# Patient Record
Sex: MALE | Race: BLACK OR AFRICAN AMERICAN | NOT HISPANIC OR LATINO | Employment: UNEMPLOYED | URBAN - METROPOLITAN AREA
[De-identification: names, ages, dates, MRNs, and addresses within clinical notes are randomized per-mention and may not be internally consistent; named-entity substitution may affect disease eponyms.]

---

## 2023-10-04 ENCOUNTER — PREP FOR PROCEDURE (OUTPATIENT)
Dept: OPHTHALMOLOGY | Facility: CLINIC | Age: 68
End: 2023-10-04
Payer: MEDICARE

## 2023-10-04 DIAGNOSIS — H25.11 AGE-RELATED NUCLEAR CATARACT OF RIGHT EYE: Primary | ICD-10-CM

## 2023-10-04 RX ORDER — CYCLOPENTOLATE HYDROCHLORIDE 10 MG/ML
1 SOLUTION/ DROPS OPHTHALMIC
Status: CANCELLED | OUTPATIENT
Start: 2023-10-04 | End: 2023-10-04

## 2023-10-04 RX ORDER — DICLOFENAC SODIUM 1 MG/ML
1 SOLUTION/ DROPS OPHTHALMIC
Status: CANCELLED | OUTPATIENT
Start: 2023-10-04 | End: 2023-10-04

## 2023-10-04 RX ORDER — MOXIFLOXACIN 5 MG/ML
1 SOLUTION/ DROPS OPHTHALMIC
Status: CANCELLED | OUTPATIENT
Start: 2023-10-04 | End: 2023-10-04

## 2023-10-04 RX ORDER — PHENYLEPHRINE HYDROCHLORIDE 25 MG/ML
1 SOLUTION/ DROPS OPHTHALMIC
Status: CANCELLED | OUTPATIENT
Start: 2023-10-04 | End: 2023-10-04

## 2023-10-04 RX ORDER — TROPICAMIDE 10 MG/ML
1 SOLUTION/ DROPS OPHTHALMIC
Status: CANCELLED | OUTPATIENT
Start: 2023-10-04 | End: 2023-10-04

## 2023-10-04 NOTE — H&P
History Of Present Illness  Espinoza Ross is a 67 y.o. male presenting with catarct, right.     Past Medical History  He has no past medical history on file.    Surgical History  He has no past surgical history on file.     Social History  He has no history on file for tobacco use, alcohol use, and drug use.     Allergies  Patient has no allergy information on record.    ROS  Patient denies ocular pain, redness, discharge, decreased vision, double vision, blind spots, flashes, or floaters.     Physical Exam  Not recorded          Assessment/Plan   Diagnoses and all orders for this visit:  Age-related nuclear cataract of right eye  -     Place in outpatient/hospital ambulatory surgery; Standing  -     Full code; Standing  -     Discharge instructions; Standing  -     Insert peripheral IV; Standing  -     Saline lock IV; Standing  -     Weigh patient; Standing  -     Measure height; Standing  -     Vital Signs; Standing  -     Pulse Oximetry; Standing  -     moxifloxacin (Vigamox) 0.5 % ophthalmic solution 1 drop  -     cyclopentolate (Cyclogyl) 1 % ophthalmic solution 1 drop  -     phenylephrine (Mydfrin) 2.5 % ophthalmic solution 1 drop  -     tropicamide (Mydriacyl) 1 % ophthalmic solution 1 drop  -     diclofenac (Voltaren) 0.1 % ophthalmic solution 1 drop  -     Case Request Operating Room: Extraction Cataract with Insertion Intraocular Lens; Standing    Proceed with ce/iol OD           Camille Durham MD

## 2023-10-18 ENCOUNTER — APPOINTMENT (OUTPATIENT)
Dept: RADIOLOGY | Facility: HOSPITAL | Age: 68
End: 2023-10-18
Payer: MEDICARE

## 2023-10-18 ENCOUNTER — HOSPITAL ENCOUNTER (EMERGENCY)
Facility: HOSPITAL | Age: 68
Discharge: NURSING, REHAB OR LONG-TERM CARE | End: 2023-10-19
Attending: EMERGENCY MEDICINE
Payer: MEDICARE

## 2023-10-18 VITALS
SYSTOLIC BLOOD PRESSURE: 194 MMHG | OXYGEN SATURATION: 100 % | HEART RATE: 85 BPM | RESPIRATION RATE: 16 BRPM | DIASTOLIC BLOOD PRESSURE: 90 MMHG | TEMPERATURE: 97.5 F

## 2023-10-18 DIAGNOSIS — M79.605 PAIN IN BOTH LOWER EXTREMITIES: Primary | ICD-10-CM

## 2023-10-18 DIAGNOSIS — M79.604 PAIN IN BOTH LOWER EXTREMITIES: Primary | ICD-10-CM

## 2023-10-18 DIAGNOSIS — F03.911 AGITATION DUE TO DEMENTIA (MULTI): ICD-10-CM

## 2023-10-18 LAB
ALBUMIN SERPL BCP-MCNC: 3.8 G/DL (ref 3.4–5)
ALP SERPL-CCNC: 70 U/L (ref 33–136)
ALT SERPL W P-5'-P-CCNC: 29 U/L (ref 10–52)
AMPHETAMINES UR QL SCN: NORMAL
ANION GAP BLDV CALCULATED.4IONS-SCNC: 7 MMOL/L (ref 10–25)
ANION GAP SERPL CALC-SCNC: 11 MMOL/L (ref 10–20)
APAP SERPL-MCNC: <10 UG/ML
APPEARANCE UR: CLEAR
AST SERPL W P-5'-P-CCNC: 42 U/L (ref 9–39)
BARBITURATES UR QL SCN: NORMAL
BASE EXCESS BLDV CALC-SCNC: 2.7 MMOL/L (ref -2–3)
BASOPHILS # BLD AUTO: 0.02 X10*3/UL (ref 0–0.1)
BASOPHILS NFR BLD AUTO: 0.3 %
BENZODIAZ UR QL SCN: NORMAL
BILIRUB SERPL-MCNC: 0.4 MG/DL (ref 0–1.2)
BILIRUB UR STRIP.AUTO-MCNC: NEGATIVE MG/DL
BODY TEMPERATURE: 37 DEGREES CELSIUS
BUN SERPL-MCNC: 10 MG/DL (ref 6–23)
BZE UR QL SCN: NORMAL
CA-I BLDV-SCNC: 1.25 MMOL/L (ref 1.1–1.33)
CALCIUM SERPL-MCNC: 9.3 MG/DL (ref 8.6–10.6)
CANNABINOIDS UR QL SCN: NORMAL
CARDIAC TROPONIN I PNL SERPL HS: 13 NG/L (ref 0–53)
CARDIAC TROPONIN I PNL SERPL HS: 33 NG/L (ref 0–53)
CARDIAC TROPONIN I PNL SERPL HS: 41 NG/L (ref 0–53)
CHLORIDE BLDV-SCNC: 100 MMOL/L (ref 98–107)
CHLORIDE SERPL-SCNC: 100 MMOL/L (ref 98–107)
CO2 SERPL-SCNC: 29 MMOL/L (ref 21–32)
COLOR UR: YELLOW
CREAT SERPL-MCNC: 0.85 MG/DL (ref 0.5–1.3)
EOSINOPHIL # BLD AUTO: 0.01 X10*3/UL (ref 0–0.7)
EOSINOPHIL NFR BLD AUTO: 0.1 %
ERYTHROCYTE [DISTWIDTH] IN BLOOD BY AUTOMATED COUNT: 13.8 % (ref 11.5–14.5)
ETHANOL SERPL-MCNC: <10 MG/DL
FENTANYL+NORFENTANYL UR QL SCN: NORMAL
GFR SERPL CREATININE-BSD FRML MDRD: >90 ML/MIN/1.73M*2
GLUCOSE BLD MANUAL STRIP-MCNC: 240 MG/DL (ref 74–99)
GLUCOSE BLD MANUAL STRIP-MCNC: 49 MG/DL (ref 74–99)
GLUCOSE BLDV-MCNC: 71 MG/DL (ref 74–99)
GLUCOSE SERPL-MCNC: 69 MG/DL (ref 74–99)
GLUCOSE UR STRIP.AUTO-MCNC: NEGATIVE MG/DL
HCO3 BLDV-SCNC: 30.2 MMOL/L (ref 22–26)
HCT VFR BLD AUTO: 32.9 % (ref 41–52)
HCT VFR BLD EST: 35 % (ref 41–52)
HGB BLD-MCNC: 11.2 G/DL (ref 13.5–17.5)
HGB BLDV-MCNC: 11.7 G/DL (ref 13.5–17.5)
HOLD SPECIMEN: NORMAL
HOLD SPECIMEN: NORMAL
IMM GRANULOCYTES # BLD AUTO: 0.01 X10*3/UL (ref 0–0.7)
IMM GRANULOCYTES NFR BLD AUTO: 0.1 % (ref 0–0.9)
KETONES UR STRIP.AUTO-MCNC: NEGATIVE MG/DL
LACTATE BLDV-SCNC: 0.6 MMOL/L (ref 0.4–2)
LEUKOCYTE ESTERASE UR QL STRIP.AUTO: NEGATIVE
LYMPHOCYTES # BLD AUTO: 2.12 X10*3/UL (ref 1.2–4.8)
LYMPHOCYTES NFR BLD AUTO: 31.3 %
MAGNESIUM SERPL-MCNC: 1.88 MG/DL (ref 1.6–2.4)
MCH RBC QN AUTO: 29.9 PG (ref 26–34)
MCHC RBC AUTO-ENTMCNC: 34 G/DL (ref 32–36)
MCV RBC AUTO: 88 FL (ref 80–100)
MONOCYTES # BLD AUTO: 1.15 X10*3/UL (ref 0.1–1)
MONOCYTES NFR BLD AUTO: 17 %
NEUTROPHILS # BLD AUTO: 3.46 X10*3/UL (ref 1.2–7.7)
NEUTROPHILS NFR BLD AUTO: 51.2 %
NITRITE UR QL STRIP.AUTO: NEGATIVE
NRBC BLD-RTO: 0 /100 WBCS (ref 0–0)
OPIATES UR QL SCN: NORMAL
OXYCODONE+OXYMORPHONE UR QL SCN: NORMAL
OXYHGB MFR BLDV: 33.5 % (ref 45–75)
PCO2 BLDV: 60 MM HG (ref 41–51)
PCP UR QL SCN: NORMAL
PH BLDV: 7.31 PH (ref 7.33–7.43)
PH UR STRIP.AUTO: 6 [PH]
PLATELET # BLD AUTO: 151 X10*3/UL (ref 150–450)
PMV BLD AUTO: 10.3 FL (ref 7.5–11.5)
PO2 BLDV: 28 MM HG (ref 35–45)
POTASSIUM BLDV-SCNC: 4.2 MMOL/L (ref 3.5–5.3)
POTASSIUM SERPL-SCNC: 4.1 MMOL/L (ref 3.5–5.3)
PROT SERPL-MCNC: 7.2 G/DL (ref 6.4–8.2)
PROT UR STRIP.AUTO-MCNC: NEGATIVE MG/DL
RBC # BLD AUTO: 3.74 X10*6/UL (ref 4.5–5.9)
RBC # UR STRIP.AUTO: NEGATIVE /UL
SALICYLATES SERPL-MCNC: <3 MG/DL
SAO2 % BLDV: 34 % (ref 45–75)
SODIUM BLDV-SCNC: 133 MMOL/L (ref 136–145)
SODIUM SERPL-SCNC: 136 MMOL/L (ref 136–145)
SP GR UR STRIP.AUTO: 1.02
TSH SERPL-ACNC: 0.57 MIU/L (ref 0.44–3.98)
UROBILINOGEN UR STRIP.AUTO-MCNC: <2 MG/DL
WBC # BLD AUTO: 6.8 X10*3/UL (ref 4.4–11.3)

## 2023-10-18 PROCEDURE — 2500000001 HC RX 250 WO HCPCS SELF ADMINISTERED DRUGS (ALT 637 FOR MEDICARE OP): Mod: SE

## 2023-10-18 PROCEDURE — 36415 COLL VENOUS BLD VENIPUNCTURE: CPT | Mod: CMCLAB

## 2023-10-18 PROCEDURE — 99284 EMERGENCY DEPT VISIT MOD MDM: CPT

## 2023-10-18 PROCEDURE — 99285 EMERGENCY DEPT VISIT HI MDM: CPT

## 2023-10-18 PROCEDURE — 82947 ASSAY GLUCOSE BLOOD QUANT: CPT | Mod: CMCLAB

## 2023-10-18 PROCEDURE — 80329 ANALGESICS NON-OPIOID 1 OR 2: CPT | Mod: CMCLAB

## 2023-10-18 PROCEDURE — 84484 ASSAY OF TROPONIN QUANT: CPT | Mod: CMCLAB

## 2023-10-18 PROCEDURE — 80307 DRUG TEST PRSMV CHEM ANLYZR: CPT

## 2023-10-18 PROCEDURE — 71046 X-RAY EXAM CHEST 2 VIEWS: CPT | Mod: FR

## 2023-10-18 PROCEDURE — 83735 ASSAY OF MAGNESIUM: CPT

## 2023-10-18 PROCEDURE — 84132 ASSAY OF SERUM POTASSIUM: CPT | Mod: CMCLAB

## 2023-10-18 PROCEDURE — 84484 ASSAY OF TROPONIN QUANT: CPT | Mod: 91

## 2023-10-18 PROCEDURE — 73502 X-RAY EXAM HIP UNI 2-3 VIEWS: CPT | Mod: LT,FR

## 2023-10-18 PROCEDURE — 84443 ASSAY THYROID STIM HORMONE: CPT

## 2023-10-18 PROCEDURE — 81003 URINALYSIS AUTO W/O SCOPE: CPT | Mod: CCI

## 2023-10-18 PROCEDURE — 71046 X-RAY EXAM CHEST 2 VIEWS: CPT | Mod: FOREIGN READ | Performed by: RADIOLOGY

## 2023-10-18 PROCEDURE — 93010 ELECTROCARDIOGRAM REPORT: CPT | Performed by: EMERGENCY MEDICINE

## 2023-10-18 PROCEDURE — 99284 EMERGENCY DEPT VISIT MOD MDM: CPT | Mod: 25 | Performed by: EMERGENCY MEDICINE

## 2023-10-18 PROCEDURE — 85025 COMPLETE CBC W/AUTO DIFF WBC: CPT

## 2023-10-18 PROCEDURE — 82947 ASSAY GLUCOSE BLOOD QUANT: CPT

## 2023-10-18 PROCEDURE — 73502 X-RAY EXAM HIP UNI 2-3 VIEWS: CPT | Mod: LEFT SIDE | Performed by: RADIOLOGY

## 2023-10-18 PROCEDURE — 2500000005 HC RX 250 GENERAL PHARMACY W/O HCPCS: Mod: SE

## 2023-10-18 PROCEDURE — 96374 THER/PROPH/DIAG INJ IV PUSH: CPT

## 2023-10-18 RX ORDER — DEXTROSE 50 % IN WATER (D50W) INTRAVENOUS SYRINGE
25 ONCE
Status: COMPLETED | OUTPATIENT
Start: 2023-10-18 | End: 2023-10-18

## 2023-10-18 RX ORDER — ACETAMINOPHEN 325 MG/1
650 TABLET ORAL ONCE
Status: COMPLETED | OUTPATIENT
Start: 2023-10-18 | End: 2023-10-18

## 2023-10-18 RX ORDER — CARVEDILOL 3.12 MG/1
3.12 TABLET ORAL
Status: DISCONTINUED | OUTPATIENT
Start: 2023-10-18 | End: 2023-10-19 | Stop reason: HOSPADM

## 2023-10-18 RX ORDER — ACETAMINOPHEN 325 MG/1
TABLET ORAL
Status: COMPLETED
Start: 2023-10-18 | End: 2023-10-18

## 2023-10-18 RX ORDER — LISINOPRIL 20 MG/1
40 TABLET ORAL DAILY
Status: DISCONTINUED | OUTPATIENT
Start: 2023-10-18 | End: 2023-10-19 | Stop reason: HOSPADM

## 2023-10-18 RX ORDER — FUROSEMIDE 40 MG/1
20 TABLET ORAL ONCE
Status: COMPLETED | OUTPATIENT
Start: 2023-10-18 | End: 2023-10-18

## 2023-10-18 RX ADMIN — CARVEDILOL 3.12 MG: 3.12 TABLET, FILM COATED ORAL at 16:46

## 2023-10-18 RX ADMIN — DEXTROSE MONOHYDRATE 25 ML: 500 INJECTION PARENTERAL at 09:15

## 2023-10-18 RX ADMIN — FUROSEMIDE 20 MG: 40 TABLET ORAL at 13:25

## 2023-10-18 RX ADMIN — LISINOPRIL 40 MG: 20 TABLET ORAL at 13:25

## 2023-10-18 RX ADMIN — ACETAMINOPHEN 650 MG: 325 TABLET ORAL at 09:40

## 2023-10-18 SDOH — HEALTH STABILITY: MENTAL HEALTH: ARE YOU HAVING THOUGHTS OF KILLING YOURSELF RIGHT NOW?: NO

## 2023-10-18 SDOH — HEALTH STABILITY: MENTAL HEALTH: HAVE YOU EVER TRIED TO KILL YOURSELF?: NO

## 2023-10-18 SDOH — HEALTH STABILITY: MENTAL HEALTH: NON-SPECIFIC ACTIVE SUICIDAL THOUGHTS (PAST 1 MONTH): NO

## 2023-10-18 SDOH — ECONOMIC STABILITY: HOUSING INSECURITY: FEELS SAFE LIVING IN HOME: YES

## 2023-10-18 SDOH — HEALTH STABILITY: MENTAL HEALTH: DEPRESSION SYMPTOMS: NO PROBLEMS REPORTED OR OBSERVED.

## 2023-10-18 SDOH — HEALTH STABILITY: MENTAL HEALTH: IN THE PAST FEW WEEKS, HAVE YOU WISHED YOU WERE DEAD?: NO

## 2023-10-18 SDOH — HEALTH STABILITY: MENTAL HEALTH: WISH TO BE DEAD (PAST 1 MONTH): NO

## 2023-10-18 SDOH — HEALTH STABILITY: MENTAL HEALTH: IN THE PAST FEW WEEKS, HAVE YOU FELT THAT YOU OR YOUR FAMILY WOULD BE BETTER OFF IF YOU WERE DEAD?: NO

## 2023-10-18 SDOH — HEALTH STABILITY: MENTAL HEALTH: SUICIDAL BEHAVIOR (LIFETIME): NO

## 2023-10-18 SDOH — HEALTH STABILITY: MENTAL HEALTH: ANXIETY SYMPTOMS: GENERALIZED

## 2023-10-18 SDOH — HEALTH STABILITY: MENTAL HEALTH: IN THE PAST WEEK, HAVE YOU BEEN HAVING THOUGHTS ABOUT KILLING YOURSELF?: NO

## 2023-10-18 ASSESSMENT — COLUMBIA-SUICIDE SEVERITY RATING SCALE - C-SSRS
6. HAVE YOU EVER DONE ANYTHING, STARTED TO DO ANYTHING, OR PREPARED TO DO ANYTHING TO END YOUR LIFE?: NO
1. IN THE PAST MONTH, HAVE YOU WISHED YOU WERE DEAD OR WISHED YOU COULD GO TO SLEEP AND NOT WAKE UP?: NO
2. HAVE YOU ACTUALLY HAD ANY THOUGHTS OF KILLING YOURSELF?: NO

## 2023-10-18 ASSESSMENT — LIFESTYLE VARIABLES
PRESCIPTION_ABUSE_PAST_12_MONTHS: NO
SUBSTANCE_ABUSE_PAST_12_MONTHS: NO

## 2023-10-18 NOTE — PROGRESS NOTES
EPAT - Social Work Psychiatric Assessment    Arrival Details  Mode of Arrival: Ambulatory  Admission Source: Home  Admission Type: Voluntary  EPAT Assessment Start Date: 10/18/23  EPAT Assessment Start Time: 1330  Name of : Serge Sanders    History of Present Illness  Admission Reason: delusions  HPI: Patient is a 67 year old AA male who presented to the ED aggressive behaviors and delusions. The patient has resided at his SNF for the past 11 years on their behavioral/psych unit. Over the past week the patient has been paranoid and delusional. A review of his Provider note was conducted. He denies any SI, HI, AH, VH and does not appear internally stimulated.    SW Readmission Information   Readmission within 30 Days: No  Readmission From: Home    Additional Symptoms - Adult  Generalized Anxiety Disorder: Difficult to control worry  Obsessive Compulsive Disorder: No problems reported or observed.  Panic Attack: No problems reported or observed.  Post Traumatic Stress Disorder: No problems reported or observed.  Delirium: No problems reported or observed.    Past Psychiatric History/Meds/Treatments  Past Psychiatric History: Patient has a history of Paranoid Schizophrenia. He receives treatment at his SNF for psychiatry and case management. He currently is on a behavioral unit that is locked. He has an unknown history of inpatient stays. His family history is unknown.  Past Psychiatric Meds/Treatments: Patient is compliant with his medications.  Past Violence/Victimization History: Patient denies    Current Mental Health Contacts   Name/Phone Number: Nicky Raza   Last Appointment Date: available daily  Provider Name/Phone Number: Nicky Raza  Provider Last Appointment Date: Available daily    Support System: Immediate family    Living Arrangement: Assisted living    Home Safety  Feels Safe Living in Home: Yes    Income Information  Employment Status for:  Patient    Milt[employer] Service/Education History  Current or Previous  Service: None  Education Level: High school  History of School Behavior Problems: No    Social/Cultural History  Social History: Patient has a guardian through the courts. He  currently lives in a locked facilty at [facility].  Important Activities: Hobbies    Legal  Assistance with Managing/Advocating Healthcare Needs: Legal Guardian  Legal Concerns: none    Drug Screening  Have you used any substances (canabis, cocaine, heroin, hallucinogens, inhalants, etc.) in the past 12 months?: No  Have you used any prescription drugs other than prescribed in the past 12 months?: No  Is a toxicology screen needed?: No    Stage of Change  Stage of Change: Precontemplation    Psychosocial  Psychosocial (WDL): Exceptions to WDL  Behaviors/Mood: Anxious  Affect: Inconsistent with mood  Parent/Guardian/Significant Other Involvement: Attentive to patient needs  Family Behaviors: Appropriate for situation  Visitor Behaviors: Appropriate for situation  Needs Expressed: Other (Comment)  Emotional Support Given: Other (Comment)         General Appearance  Motor Activity: Agitation  Speech Pattern: Stuttering  General Attitude: Cooperative  Appearance/Hygiene: Disheveled    Thought Process  Coherency: Disorganized  Content: Unremarkable  Delusions: Controlled  Perception: Not altered  Hallucination: None  Judgment/Insight: Limited  Confusion: Mild  Cognition: Poor judgement    Sleep Pattern  Sleep Pattern: Difficulty falling asleep    Risk Factors  Self Harm/Suicidal Ideation Plan: none  Previous Self Harm/Suicidal Plans: none  Risk Factors: None  Description of Thoughts/Ideas Leaving Unit Now: none    Violence Risk Assessment  Assessment of Violence: None noted  Thoughts of Harm to Others: No    Ask Suicide-Screening Questions  1. In the past few weeks, have you wished you were dead?: No  2. In the past few weeks, have you felt that you or your family  would be better off if you were dead?: No  3. In the past week, have you been having thoughts about killing yourself?: No  4. Have you ever tried to kill yourself?: No  5. Are you having thoughts of killing yourself right now?: No  Calculated Risk Score: No intervention is necessary  [place] Suicide Severity Rating Scale (Screener/Recent Self-Report)  1. Wish to be Dead (Past 1 Month): No  2. Non-Specific Active Suicidal Thoughts (Past 1 Month): No  6. Suicidal Behavior (Lifetime): No  Calculated C-SSRS Risk Score (Lifetime/Recent): No Risk Indicated  Step 1: Risk Factors  Current & Past Psychiatric Dx: Mood disorder  Presenting Symptoms: Anxiety and/or panic  Precipitants/Stressors: Triggering events leading to humiliation, shame, and/or despair (e.g. loss of relationship, financial or health status) (real or anticipated)  Change in Treatment: Change in provider or treament (i.e., medications, psychotherapy, milieu)  Access to Lethal Methods : No  Step 2: Protective Factors   Protective Factors Internal: Ability to cope with stress  Protective Factors External: Cultural, spiritual and/or moral attitudes against suicide  Step 3: Suicidal Ideation Intensity  How Many Times Have You Had These Thoughts: Less than once a week  When You Have the Thoughts How Long do They Last : Fleeting - few seconds or minutes  Could/Can You Stop Thinking About Killing Yourself or Wanting to Die if You Want to: Easily able to control thoughts  Are There Things - Anyone or Anything - That Stopped You From Wanting to Die or Acting on: Deterrents definitely stopped you from attempting suicide  What Sort of Reasons Did You Have For Thinking About Wanting to Die or Killing Yourself: Completely to get attention, revenge, or a reaction from others  Total Score: 5  Step 5: Documentation  Risk Level: Low suicide risk    Psychiatric Impression and Plan of Care  Assessment and Plan: Patient is a 67 year old AA male with Dementia, hx of Paranoid  "Schizophrenia and Mood disorder. Today at his SNF he became paranoid and blocked himself in his room. He was also agressive towards staff. The patient currently resides on the locked behavioral unit at [facility]. He has access to mental health services and 24/7 treatment. During the assessment the patient was cooperative. He was observed talking to himself. The patient was Alert X4 during the assessment. He shared \" I have lived at that place for 12 years and I want to leave\". He was referring to his SNF where it was confirmed he has been there for 11 years. The patient denied any thoughts of Suicide or homicide as well as hallucinatoins. Provider note indicates he was talking about using drugs and was disorganized. Overall the patient was slightly disorganized but did not mention any substance use.    Spoke with the patient's guardian. He shared the patient is at his baseline. He stated he is \"always fighting with someone there, trying to get high or aggressive\". He shared that he felt the patient could return back to his SNF based on today's reports. He stated he has all of his needs met at [facility].       A discussion took place with [facility]. They shared the patient has been a resident there for more than a decade. This morning they notices he was \"having a rough day\" and asked him if he wanted to go to the hospital and the patient agreed so they sent him via EMS. They are will to take the patient back. They are reaching out to his psychiatry for follow up.  Specific Resources Provided to Patient: [facility] Notified: yes  PHP/IOP Recommended: none  Specific Information Provided for PHP/IOP: none  Plan Comments: none    Outcome/Disposition  Patient's Perception of Outcome Achieved: Patient is future oriented  Assessment, Recommendations and Risk Level Reviewed with: LATASHA Yo  Contact Name: Yunior Kelly  Contact Number(s): [phone]  Contact Relationship: " guardian  EPAT Assessment Completed Date: 10/18/23  EPAT Assessment Completed Time: 1453  Patient Disposition: Home

## 2023-10-18 NOTE — PROGRESS NOTES
Patient received on handoff.  Discharged back to his locked [facility] facility was pending repeat troponin.  This had increased from 11-31.  However, the initial had been drawn approximately 5 hours prior to the second.  Third was drawn and repeat EKG demonstrated no change with no acute injury pattern.  Troponin had gone up several points, he remained asymptomatic and was otherwise stable.  Patient deemed safe to discharge back to his [facility] unit.

## 2023-10-18 NOTE — ED PROVIDER NOTES
"HPI   Chief Complaint   Patient presents with    Social work consult - \"leg is tight\"       67-year-old male with history of schizophrenia, dementia, HTN, IDDM 2, and GERD presents for chief complaint of agitation with left leg discomfort.  Lives at [facility] and does not like it there, per patient.  States that the people that make him angry.  States that he would like to talk to social work regarding his living situation.  Also endorses some left leg discomfort, initially stating his left hip/femur area, but later changing story to below the knee.  Denies injury.  Endorses that has been ongoing for at least 4 weeks.  Denies any rashes or swelling.  Denies changes in urination or bowel movement.  Denies fevers, chills, myalgia.  Denies headache or vision changes or dizziness.  Denies numbness or tingling or weakness.                          [place] Coma Scale Score: 15                  Patient History   History reviewed. No pertinent past medical history.  History reviewed. No pertinent surgical history.  No family history on file.  Social History     Tobacco Use    Smoking status: Not on file    Smokeless tobacco: Not on file   Substance Use Topics    Alcohol use: Not on file    Drug use: Not on file       Physical Exam   ED Triage Vitals [10/18/23 0903]   Temp Heart Rate Resp BP   36.4 °C (97.5 °F) 81 16 (!) 209/100      SpO2 Temp Source Heart Rate Source Patient Position   100 % Oral -- Lying      BP Location FiO2 (%)     Left arm --       Physical Exam  Constitutional:       Appearance: Normal appearance.   HENT:      Head: Normocephalic and atraumatic.      Mouth/Throat:      Mouth: Mucous membranes are moist.      Pharynx: Oropharynx is clear.   Eyes:      Extraocular Movements: Extraocular movements intact.      Conjunctiva/sclera: Conjunctivae normal.      Pupils: Pupils are equal, round, and reactive to light.   Cardiovascular:      Rate and Rhythm: Normal rate and regular rhythm.      Pulses: " "Normal pulses.      Heart sounds: Normal heart sounds.   Pulmonary:      Effort: Pulmonary effort is normal.      Breath sounds: Normal breath sounds.   Abdominal:      General: Abdomen is flat.      Palpations: Abdomen is soft.   Musculoskeletal:         General: Normal range of motion.      Cervical back: Normal range of motion and neck supple.   Skin:     General: Skin is warm and dry.      Capillary Refill: Capillary refill takes less than 2 seconds.   Neurological:      General: No focal deficit present.      Mental Status: He is alert and oriented to person, place, and time.      GCS: GCS eye subscore is 4. GCS verbal subscore is 5. GCS motor subscore is 6.      Cranial Nerves: Cranial nerves 2-12 are intact.      Motor: Motor function is intact.      Coordination: Coordination is intact.   Psychiatric:         Mood and Affect: Mood normal.         Behavior: Behavior normal.         Thought Content: Thought content normal.         Judgment: Judgment normal.         ED Course & MDM        Medical Decision Making  67-year-old male with history of schizophrenia, dementia, DM 2, and hypertension being worked up in the ED for agitation at nursing home and living situation concerns.  Vital signs reviewed, significant for hypertension at 209/100.  We did consider hypertensive encephalopathy but patient appears to be answering all questions appropriately and is currently calm and cooperative.  Denies chest pain, dyspnea, vision changes, or headache/dizziness.  Vital signs otherwise unremarkable.  Patient is well-appearing and in no apparent distress.  Diagnostic testings were performed.  Found to be hypoglycemic at 49.  Refused to drink or eat anything due to \"fasting\", and so D50 was given via IV. Blood sugar rechecked and improved to 240.  Urine drug screen unremarkable.  CBC with differential shows no leukocytosis or leukopenia and mild stable anemia with hemoglobin 11.2 and hematocrit 32.9.  CMP showed initial " glucose 69 and slightly elevated AST at 42.  Troponin 13 and pending second.  TSH, magnesium, magnesium, acute toxicology panel within normal ranges and unremarkable.  Urinalysis shows no evidence of UTI.  Psychiatry  with [facility] was able to come and talk to the patient.  They did get collateral as well.  At this point they do not recommend inpatient placement.  The patient is in a locked geropsych unit and an inpatient psychiatry place would not do any different.  Aj Abrams from [facility] stated that he was able to call the patient's residence and they stated that he is welcome to come back.  They state that he was given an option to come to the ED and he chose to come.  Patient was notified of this and states that he feels fine and is in agreement to go back.  His blood pressure was elevated initially, but after giving some home medications, it improved to 192/84.  Staff at the nursing home stated that he just had never had his blood pressure meds today due to his agitation.  In the end of my shift he remained calm and cooperative in a stable condition.  EKG nonischemic.  Showed normal sinus rhythm with no evidence of ischemia.  Rate 83.  OR interval 164.  .  QTc 474.  Handed off to oncoming provider pending second troponin.        Procedure  Procedures      ATTENDING ATTESTATION  History of polysubstance use disorder, schizophrenia presenting to the emergency department from his nursing home with concern for bizarre behavior.  Patient also has a concomitant history of insulin-dependent diabetes, on arrival the patient was found to be slightly hypoglycemic he was given food, dextrose.  Patient has members at the facility helping him with his medications.  Patient's vital signs are otherwise stable besides hypertension, we will repeat the blood pressure.  He is moving all extremities with symmetric strength he is awake alert nothing concerning me for potential intracranial abnormality nonetheless given  the change in mental status according to nursing home we will CT.  Patient was complaining of some left hip knee ankle discomfort, no falls injuries or trauma, ongoing for the past month or so, he has symmetric legs no lower extremity pitting edema no pain along any of the vasculature nothing to suggest DVT.  Hip knee and ankle all unremarkable no warmth no edema no ballotable effusion no clinical evidence of a septic joint.  He has no breakdown of the skin that would make me concerned for cellulitis.  There was concern the patient may have used a  stimulant with a history of crack use, aside from hypertension he does not appear to have a overt sympathomimetic toxidrome -mild tachycardia with heart rate of 100 and hypertension are somewhat consistent, the pupils are not frankly mydriatic.  We will pursue a metabolic evaluation given the cramps in the legs, ensure electrolytes are appropriate, patient's sugar has been repleted we will perform serial glucose checks to ensure the patient is not recurrently hypoglycemic pursue EKG.  Patient will be observed in the emergency department for symptom improvement in case the patient did in fact ingest something, he is denying escalation of baseline auditory hallucinations at this point he does not appear frankly internally stimulated we will consider psychiatric consultation as indicated.    EKG reviewed independently by me and agree with interpretation above.    Kyle Pearce, [facility]  [facility]    The patient was seen by the resident/fellow.  I have personally performed a substantive portion of the encounter.  I have seen and examined the patient; agree with the workup, evaluation, MDM, management and diagnosis.    I have reviewed all the nurses' notes and have confirmed their findings, and have incorporated those findings into this medical record.   The care plan has been discussed with the resident/fellow; I  have reviewed the resident/fellow’s note and agree with the documented findings with the exception/addition of information listed above.  On my own examination I agree and incorporated in this document my own history, examination findings and clinical decision making.  All notation in this Addendum supersedes information presented by the resident or SABI as listed above.        Olivier Yo, JOSHUA-CNP  10/18/23 1536

## 2023-10-18 NOTE — ED TRIAGE NOTES
"Pt presents to the ED from [facility] via [facility] after nursing home staff reported him yelling at staff and being uncooperative with care. Upon EMS arrival, pt denied any complaints, apart from those relating to his desire to not be at said SNF. BG 59, provided with oral glucose en route. On arrival, pt is A&Ox3-4, mumbling to self and rambling about crack/cocaine, but otherwise oriented. BG 49. Pt refusing OJ/food, states \"he's fasting\" - SABI aware. Pt also endorsing new onset (this AM) LLE \"muscle tightness.\" Pt otherwise well appearing. Denies SI/HI/VH/AH. Per facesheet, Hx paranoid schizophrenia, hypertension, gerd, hepatitis c, bph, dementia, pancreatitis, dyskinesia, DM2.  " Hospitalist

## 2023-10-18 NOTE — DISCHARGE INSTRUCTIONS
Through discussion with social work, psych, patient has agreed to return to his prior facility.  The remainder of his work-up was largely unremarkable.  If his mental status acutely worsens, he develops chest pain, shortness of breath, or other worrisome symptoms, return to the emergency department.

## 2023-10-19 ENCOUNTER — APPOINTMENT (OUTPATIENT)
Dept: CARDIOLOGY | Facility: HOSPITAL | Age: 68
End: 2023-10-19
Payer: MEDICARE

## 2023-10-19 LAB
ATRIAL RATE: 79 BPM
ATRIAL RATE: 80 BPM
P AXIS: 77 DEGREES
P AXIS: 77 DEGREES
P OFFSET: 168 MS
P OFFSET: 170 MS
P ONSET: 122 MS
P ONSET: 125 MS
PR INTERVAL: 166 MS
PR INTERVAL: 170 MS
Q ONSET: 207 MS
Q ONSET: 208 MS
QRS COUNT: 13 BEATS
QRS COUNT: 13 BEATS
QRS DURATION: 108 MS
QRS DURATION: 112 MS
QT INTERVAL: 414 MS
QT INTERVAL: 420 MS
QTC CALCULATION(BAZETT): 474 MS
QTC CALCULATION(BAZETT): 484 MS
QTC FREDERICIA: 454 MS
QTC FREDERICIA: 462 MS
R AXIS: -45 DEGREES
R AXIS: -48 DEGREES
T AXIS: 49 DEGREES
T AXIS: 53 DEGREES
T OFFSET: 415 MS
T OFFSET: 417 MS
VENTRICULAR RATE: 79 BPM
VENTRICULAR RATE: 80 BPM

## 2023-10-19 PROCEDURE — 93005 ELECTROCARDIOGRAM TRACING: CPT

## 2023-10-19 PROCEDURE — 93005 ELECTROCARDIOGRAM TRACING: CPT | Mod: 77

## 2023-10-30 ENCOUNTER — HOSPITAL ENCOUNTER (EMERGENCY)
Facility: HOSPITAL | Age: 68
Discharge: HOME | End: 2023-10-31
Attending: EMERGENCY MEDICINE
Payer: MEDICARE

## 2023-10-30 ENCOUNTER — APPOINTMENT (OUTPATIENT)
Dept: RADIOLOGY | Facility: HOSPITAL | Age: 68
End: 2023-10-30
Payer: MEDICARE

## 2023-10-30 DIAGNOSIS — S01.512A LACERATION OF ORAL CAVITY, INITIAL ENCOUNTER: Primary | ICD-10-CM

## 2023-10-30 LAB — GLUCOSE BLD MANUAL STRIP-MCNC: 115 MG/DL (ref 74–99)

## 2023-10-30 PROCEDURE — 82947 ASSAY GLUCOSE BLOOD QUANT: CPT

## 2023-10-30 PROCEDURE — 70450 CT HEAD/BRAIN W/O DYE: CPT | Performed by: RADIOLOGY

## 2023-10-30 PROCEDURE — 72125 CT NECK SPINE W/O DYE: CPT | Mod: ME

## 2023-10-30 PROCEDURE — 70486 CT MAXILLOFACIAL W/O DYE: CPT | Mod: MG

## 2023-10-30 PROCEDURE — 99285 EMERGENCY DEPT VISIT HI MDM: CPT | Mod: 25 | Performed by: EMERGENCY MEDICINE

## 2023-10-30 PROCEDURE — 2500000001 HC RX 250 WO HCPCS SELF ADMINISTERED DRUGS (ALT 637 FOR MEDICARE OP): Mod: SE | Performed by: STUDENT IN AN ORGANIZED HEALTH CARE EDUCATION/TRAINING PROGRAM

## 2023-10-30 PROCEDURE — 72125 CT NECK SPINE W/O DYE: CPT | Performed by: RADIOLOGY

## 2023-10-30 PROCEDURE — 76377 3D RENDER W/INTRP POSTPROCES: CPT | Performed by: RADIOLOGY

## 2023-10-30 PROCEDURE — 70486 CT MAXILLOFACIAL W/O DYE: CPT | Performed by: RADIOLOGY

## 2023-10-30 PROCEDURE — 12051 INTMD RPR FACE/MM 2.5 CM/<: CPT

## 2023-10-30 PROCEDURE — 99285 EMERGENCY DEPT VISIT HI MDM: CPT | Performed by: EMERGENCY MEDICINE

## 2023-10-30 PROCEDURE — 76376 3D RENDER W/INTRP POSTPROCES: CPT | Mod: MG

## 2023-10-30 PROCEDURE — 70450 CT HEAD/BRAIN W/O DYE: CPT | Mod: MG

## 2023-10-30 RX ORDER — CHLORHEXIDINE GLUCONATE ORAL RINSE 1.2 MG/ML
15 SOLUTION DENTAL 3 TIMES DAILY
Qty: 118 ML | Refills: 0 | Status: SHIPPED | OUTPATIENT
Start: 2023-10-30

## 2023-10-30 RX ORDER — AZITHROMYCIN 500 MG/1
500 TABLET, FILM COATED ORAL ONCE
Status: COMPLETED | OUTPATIENT
Start: 2023-10-30 | End: 2023-10-31

## 2023-10-30 RX ORDER — AZITHROMYCIN 250 MG/1
250 TABLET, FILM COATED ORAL DAILY
Qty: 5 TABLET | Refills: 0 | Status: SHIPPED | OUTPATIENT
Start: 2023-10-30 | End: 2023-11-14 | Stop reason: HOSPADM

## 2023-10-30 RX ORDER — ACETAMINOPHEN 325 MG/1
975 TABLET ORAL ONCE
Status: COMPLETED | OUTPATIENT
Start: 2023-10-30 | End: 2023-10-30

## 2023-10-30 RX ORDER — IBUPROFEN 600 MG/1
600 TABLET ORAL ONCE
Status: COMPLETED | OUTPATIENT
Start: 2023-10-30 | End: 2023-10-30

## 2023-10-30 RX ADMIN — ACETAMINOPHEN 975 MG: 325 TABLET ORAL at 19:31

## 2023-10-30 RX ADMIN — IBUPROFEN 600 MG: 600 TABLET, FILM COATED ORAL at 19:31

## 2023-10-30 ASSESSMENT — PAIN SCALES - GENERAL: PAINLEVEL_OUTOF10: 5 - MODERATE PAIN

## 2023-10-30 ASSESSMENT — COLUMBIA-SUICIDE SEVERITY RATING SCALE - C-SSRS
2. HAVE YOU ACTUALLY HAD ANY THOUGHTS OF KILLING YOURSELF?: NO
1. IN THE PAST MONTH, HAVE YOU WISHED YOU WERE DEAD OR WISHED YOU COULD GO TO SLEEP AND NOT WAKE UP?: NO
6. HAVE YOU EVER DONE ANYTHING, STARTED TO DO ANYTHING, OR PREPARED TO DO ANYTHING TO END YOUR LIFE?: NO
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
## 2023-10-31 VITALS
HEIGHT: 65 IN | HEART RATE: 80 BPM | SYSTOLIC BLOOD PRESSURE: 164 MMHG | WEIGHT: 175 LBS | DIASTOLIC BLOOD PRESSURE: 82 MMHG | TEMPERATURE: 99.1 F | RESPIRATION RATE: 18 BRPM | BODY MASS INDEX: 29.16 KG/M2 | OXYGEN SATURATION: 98 %

## 2023-10-31 PROCEDURE — 2500000001 HC RX 250 WO HCPCS SELF ADMINISTERED DRUGS (ALT 637 FOR MEDICARE OP): Mod: SE | Performed by: EMERGENCY MEDICINE

## 2023-10-31 RX ADMIN — AZITHROMYCIN 500 MG: 500 TABLET, FILM COATED ORAL at 02:03
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
## 2023-11-08 ENCOUNTER — APPOINTMENT (OUTPATIENT)
Dept: RADIOLOGY | Facility: HOSPITAL | Age: 68
DRG: 917 | End: 2023-11-08
Payer: MEDICARE

## 2023-11-08 ENCOUNTER — APPOINTMENT (OUTPATIENT)
Dept: NEUROLOGY | Facility: HOSPITAL | Age: 68
DRG: 917 | End: 2023-11-08
Payer: MEDICARE

## 2023-11-08 ENCOUNTER — HOSPITAL ENCOUNTER (INPATIENT)
Facility: HOSPITAL | Age: 68
LOS: 6 days | Discharge: NURSING, REHAB OR LONG-TERM CARE | DRG: 917 | End: 2023-11-14
Attending: EMERGENCY MEDICINE | Admitting: STUDENT IN AN ORGANIZED HEALTH CARE EDUCATION/TRAINING PROGRAM
Payer: MEDICARE

## 2023-11-08 DIAGNOSIS — F20.9 SCHIZOPHRENIA, UNSPECIFIED TYPE (MULTI): ICD-10-CM

## 2023-11-08 DIAGNOSIS — J96.00 ACUTE RESPIRATORY FAILURE, UNSPECIFIED WHETHER WITH HYPOXIA OR HYPERCAPNIA (MULTI): ICD-10-CM

## 2023-11-08 DIAGNOSIS — I10 PRIMARY HYPERTENSION: ICD-10-CM

## 2023-11-08 DIAGNOSIS — E11.9 TYPE 2 DIABETES MELLITUS WITHOUT COMPLICATION, WITH LONG-TERM CURRENT USE OF INSULIN (MULTI): ICD-10-CM

## 2023-11-08 DIAGNOSIS — R41.89 UNRESPONSIVE: Primary | ICD-10-CM

## 2023-11-08 DIAGNOSIS — I45.2 BIFASCICULAR BLOCK: ICD-10-CM

## 2023-11-08 DIAGNOSIS — G89.29 OTHER CHRONIC PAIN: ICD-10-CM

## 2023-11-08 DIAGNOSIS — I45.4 BUNDLE BRANCH BLOCK: ICD-10-CM

## 2023-11-08 DIAGNOSIS — H25.11 AGE-RELATED NUCLEAR CATARACT OF RIGHT EYE: ICD-10-CM

## 2023-11-08 DIAGNOSIS — K59.00 CONSTIPATION, UNSPECIFIED CONSTIPATION TYPE: ICD-10-CM

## 2023-11-08 DIAGNOSIS — Z79.4 TYPE 2 DIABETES MELLITUS WITHOUT COMPLICATION, WITH LONG-TERM CURRENT USE OF INSULIN (MULTI): ICD-10-CM

## 2023-11-08 LAB
ACANTHOCYTES BLD QL SMEAR: NORMAL
ALBUMIN SERPL BCP-MCNC: 3.7 G/DL (ref 3.4–5)
ALP SERPL-CCNC: 52 U/L (ref 33–136)
ALT SERPL W P-5'-P-CCNC: 26 U/L (ref 10–52)
AMORPH CRY #/AREA UR COMP ASSIST: ABNORMAL /HPF
AMPHETAMINES UR QL SCN: ABNORMAL
ANION GAP BLDV CALCULATED.4IONS-SCNC: 11 MMOL/L (ref 10–25)
ANION GAP BLDV CALCULATED.4IONS-SCNC: 7 MMOL/L (ref 10–25)
ANION GAP SERPL CALC-SCNC: 15 MMOL/L (ref 10–20)
APPEARANCE UR: ABNORMAL
APTT PPP: 26 SECONDS (ref 27–38)
AST SERPL W P-5'-P-CCNC: 50 U/L (ref 9–39)
BARBITURATES UR QL SCN: ABNORMAL
BASE EXCESS BLDV CALC-SCNC: -0.4 MMOL/L (ref -2–3)
BASE EXCESS BLDV CALC-SCNC: 3.8 MMOL/L (ref -2–3)
BASOPHILS # BLD MANUAL: 0 X10*3/UL (ref 0–0.1)
BASOPHILS NFR BLD MANUAL: 0 %
BENZODIAZ UR QL SCN: ABNORMAL
BILIRUB SERPL-MCNC: 0.4 MG/DL (ref 0–1.2)
BILIRUB UR STRIP.AUTO-MCNC: ABNORMAL MG/DL
BNP SERPL-MCNC: 7 PG/ML (ref 0–99)
BODY TEMPERATURE: 37 DEGREES CELSIUS
BODY TEMPERATURE: 37 DEGREES CELSIUS
BUN SERPL-MCNC: 26 MG/DL (ref 6–23)
BURR CELLS BLD QL SMEAR: NORMAL
BZE UR QL SCN: ABNORMAL
CA-I BLDV-SCNC: 1.09 MMOL/L (ref 1.1–1.33)
CA-I BLDV-SCNC: 1.17 MMOL/L (ref 1.1–1.33)
CALCIUM SERPL-MCNC: 9.4 MG/DL (ref 8.6–10.6)
CANNABINOIDS UR QL SCN: ABNORMAL
CARDIAC TROPONIN I PNL SERPL HS: 8 NG/L (ref 0–53)
CHLORIDE BLDV-SCNC: 104 MMOL/L (ref 98–107)
CHLORIDE BLDV-SCNC: 105 MMOL/L (ref 98–107)
CHLORIDE SERPL-SCNC: 103 MMOL/L (ref 98–107)
CO2 SERPL-SCNC: 25 MMOL/L (ref 21–32)
COLOR UR: YELLOW
CREAT SERPL-MCNC: 2.23 MG/DL (ref 0.5–1.3)
EOSINOPHIL # BLD MANUAL: 0.07 X10*3/UL (ref 0–0.7)
EOSINOPHIL NFR BLD MANUAL: 0.9 %
ERYTHROCYTE [DISTWIDTH] IN BLOOD BY AUTOMATED COUNT: 13.5 % (ref 11.5–14.5)
FENTANYL+NORFENTANYL UR QL SCN: ABNORMAL
GFR SERPL CREATININE-BSD FRML MDRD: 32 ML/MIN/1.73M*2
GLUCOSE BLD MANUAL STRIP-MCNC: 104 MG/DL (ref 74–99)
GLUCOSE BLD MANUAL STRIP-MCNC: 124 MG/DL (ref 74–99)
GLUCOSE BLD MANUAL STRIP-MCNC: 88 MG/DL (ref 74–99)
GLUCOSE BLDV-MCNC: 119 MG/DL (ref 74–99)
GLUCOSE BLDV-MCNC: 154 MG/DL (ref 74–99)
GLUCOSE SERPL-MCNC: 108 MG/DL (ref 74–99)
GLUCOSE UR STRIP.AUTO-MCNC: NEGATIVE MG/DL
HCO3 BLDV-SCNC: 24.8 MMOL/L (ref 22–26)
HCO3 BLDV-SCNC: 31.7 MMOL/L (ref 22–26)
HCT VFR BLD AUTO: 37.2 % (ref 41–52)
HCT VFR BLD EST: 38 % (ref 41–52)
HCT VFR BLD EST: 39 % (ref 41–52)
HGB BLD-MCNC: 11.9 G/DL (ref 13.5–17.5)
HGB BLDV-MCNC: 12.5 G/DL (ref 13.5–17.5)
HGB BLDV-MCNC: 12.9 G/DL (ref 13.5–17.5)
HOLD SPECIMEN: NORMAL
HOLD SPECIMEN: NORMAL
HYALINE CASTS #/AREA URNS AUTO: ABNORMAL /LPF
IMM GRANULOCYTES # BLD AUTO: 0.06 X10*3/UL (ref 0–0.7)
IMM GRANULOCYTES NFR BLD AUTO: 0.7 % (ref 0–0.9)
INR PPP: 1.2 (ref 0.9–1.1)
KETONES UR STRIP.AUTO-MCNC: ABNORMAL MG/DL
LACTATE BLDV-SCNC: 1.2 MMOL/L (ref 0.4–2)
LACTATE BLDV-SCNC: 1.2 MMOL/L (ref 0.4–2)
LACTATE SERPL-SCNC: 1 MMOL/L (ref 0.4–2)
LEUKOCYTE ESTERASE UR QL STRIP.AUTO: NEGATIVE
LYMPHOCYTES # BLD MANUAL: 4.29 X10*3/UL (ref 1.2–4.8)
LYMPHOCYTES NFR BLD MANUAL: 51.7 %
MAGNESIUM SERPL-MCNC: 2.03 MG/DL (ref 1.6–2.4)
MCH RBC QN AUTO: 28.4 PG (ref 26–34)
MCHC RBC AUTO-ENTMCNC: 32 G/DL (ref 32–36)
MCV RBC AUTO: 89 FL (ref 80–100)
MONOCYTES # BLD MANUAL: 0.44 X10*3/UL (ref 0.1–1)
MONOCYTES NFR BLD MANUAL: 5.3 %
NEUTS SEG # BLD MANUAL: 3.49 X10*3/UL (ref 1.2–7)
NEUTS SEG NFR BLD MANUAL: 42.1 %
NITRITE UR QL STRIP.AUTO: NEGATIVE
NRBC BLD-RTO: 0 /100 WBCS (ref 0–0)
OPIATES UR QL SCN: ABNORMAL
OXYCODONE+OXYMORPHONE UR QL SCN: ABNORMAL
OXYHGB MFR BLDV: 22.7 % (ref 45–75)
OXYHGB MFR BLDV: 26.9 % (ref 45–75)
PCO2 BLDV: 42 MM HG (ref 41–51)
PCO2 BLDV: 63 MM HG (ref 41–51)
PCP UR QL SCN: ABNORMAL
PH BLDV: 7.31 PH (ref 7.33–7.43)
PH BLDV: 7.38 PH (ref 7.33–7.43)
PH UR STRIP.AUTO: 5.5 [PH]
PHOSPHATE SERPL-MCNC: 5.4 MG/DL (ref 2.5–4.9)
PLATELET # BLD AUTO: 258 X10*3/UL (ref 150–450)
PO2 BLDV: 25 MM HG (ref 35–45)
PO2 BLDV: 29 MM HG (ref 35–45)
POTASSIUM BLDV-SCNC: 4.3 MMOL/L (ref 3.5–5.3)
POTASSIUM BLDV-SCNC: 4.7 MMOL/L (ref 3.5–5.3)
POTASSIUM SERPL-SCNC: 5 MMOL/L (ref 3.5–5.3)
PROT SERPL-MCNC: 8 G/DL (ref 6.4–8.2)
PROT UR STRIP.AUTO-MCNC: ABNORMAL MG/DL
PROTHROMBIN TIME: 13.6 SECONDS (ref 9.8–12.8)
RBC # BLD AUTO: 4.19 X10*6/UL (ref 4.5–5.9)
RBC # UR STRIP.AUTO: ABNORMAL /UL
RBC #/AREA URNS AUTO: ABNORMAL /HPF
RBC MORPH BLD: NORMAL
RENAL EPI CELLS #/AREA UR COMP ASSIST: ABNORMAL /HPF
SAO2 % BLDV: 23 % (ref 45–75)
SAO2 % BLDV: 27 % (ref 45–75)
SODIUM BLDV-SCNC: 136 MMOL/L (ref 136–145)
SODIUM BLDV-SCNC: 138 MMOL/L (ref 136–145)
SODIUM SERPL-SCNC: 138 MMOL/L (ref 136–145)
SP GR UR STRIP.AUTO: 1.02
SQUAMOUS #/AREA URNS AUTO: ABNORMAL /HPF
TOTAL CELLS COUNTED BLD: 114
TSH SERPL-ACNC: 1.23 MIU/L (ref 0.44–3.98)
UROBILINOGEN UR STRIP.AUTO-MCNC: 0.2 MG/DL
WBC # BLD AUTO: 8.3 X10*3/UL (ref 4.4–11.3)
WBC #/AREA URNS AUTO: ABNORMAL /HPF

## 2023-11-08 PROCEDURE — 82947 ASSAY GLUCOSE BLOOD QUANT: CPT

## 2023-11-08 PROCEDURE — 94002 VENT MGMT INPAT INIT DAY: CPT

## 2023-11-08 PROCEDURE — 85007 BL SMEAR W/DIFF WBC COUNT: CPT | Performed by: EMERGENCY MEDICINE

## 2023-11-08 PROCEDURE — 80307 DRUG TEST PRSMV CHEM ANLYZR: CPT

## 2023-11-08 PROCEDURE — 36415 COLL VENOUS BLD VENIPUNCTURE: CPT | Performed by: EMERGENCY MEDICINE

## 2023-11-08 PROCEDURE — 94799 UNLISTED PULMONARY SVC/PX: CPT

## 2023-11-08 PROCEDURE — 82746 ASSAY OF FOLIC ACID SERUM: CPT

## 2023-11-08 PROCEDURE — 93010 ELECTROCARDIOGRAM REPORT: CPT | Performed by: EMERGENCY MEDICINE

## 2023-11-08 PROCEDURE — 2500000004 HC RX 250 GENERAL PHARMACY W/ HCPCS (ALT 636 FOR OP/ED): Mod: SE

## 2023-11-08 PROCEDURE — C9113 INJ PANTOPRAZOLE SODIUM, VIA: HCPCS | Performed by: STUDENT IN AN ORGANIZED HEALTH CARE EDUCATION/TRAINING PROGRAM

## 2023-11-08 PROCEDURE — 83605 ASSAY OF LACTIC ACID: CPT | Performed by: EMERGENCY MEDICINE

## 2023-11-08 PROCEDURE — 2500000004 HC RX 250 GENERAL PHARMACY W/ HCPCS (ALT 636 FOR OP/ED): Performed by: EMERGENCY MEDICINE

## 2023-11-08 PROCEDURE — 99291 CRITICAL CARE FIRST HOUR: CPT | Performed by: EMERGENCY MEDICINE

## 2023-11-08 PROCEDURE — 70450 CT HEAD/BRAIN W/O DYE: CPT

## 2023-11-08 PROCEDURE — 2500000004 HC RX 250 GENERAL PHARMACY W/ HCPCS (ALT 636 FOR OP/ED): Performed by: STUDENT IN AN ORGANIZED HEALTH CARE EDUCATION/TRAINING PROGRAM

## 2023-11-08 PROCEDURE — 84100 ASSAY OF PHOSPHORUS: CPT | Performed by: EMERGENCY MEDICINE

## 2023-11-08 PROCEDURE — 31500 INSERT EMERGENCY AIRWAY: CPT

## 2023-11-08 PROCEDURE — 0BH17EZ INSERTION OF ENDOTRACHEAL AIRWAY INTO TRACHEA, VIA NATURAL OR ARTIFICIAL OPENING: ICD-10-PCS | Performed by: EMERGENCY MEDICINE

## 2023-11-08 PROCEDURE — 80053 COMPREHEN METABOLIC PANEL: CPT

## 2023-11-08 PROCEDURE — 84443 ASSAY THYROID STIM HORMONE: CPT | Performed by: STUDENT IN AN ORGANIZED HEALTH CARE EDUCATION/TRAINING PROGRAM

## 2023-11-08 PROCEDURE — 81001 URINALYSIS AUTO W/SCOPE: CPT | Performed by: EMERGENCY MEDICINE

## 2023-11-08 PROCEDURE — 84443 ASSAY THYROID STIM HORMONE: CPT

## 2023-11-08 PROCEDURE — 71045 X-RAY EXAM CHEST 1 VIEW: CPT | Mod: FOREIGN READ | Performed by: RADIOLOGY

## 2023-11-08 PROCEDURE — 31500 INSERT EMERGENCY AIRWAY: CPT | Performed by: EMERGENCY MEDICINE

## 2023-11-08 PROCEDURE — 71045 X-RAY EXAM CHEST 1 VIEW: CPT | Mod: FY,FR

## 2023-11-08 PROCEDURE — 96372 THER/PROPH/DIAG INJ SC/IM: CPT

## 2023-11-08 PROCEDURE — 71045 X-RAY EXAM CHEST 1 VIEW: CPT | Performed by: RADIOLOGY

## 2023-11-08 PROCEDURE — 5A1945Z RESPIRATORY VENTILATION, 24-96 CONSECUTIVE HOURS: ICD-10-PCS | Performed by: EMERGENCY MEDICINE

## 2023-11-08 PROCEDURE — 93010 ELECTROCARDIOGRAM REPORT: CPT | Performed by: INTERNAL MEDICINE

## 2023-11-08 PROCEDURE — 87040 BLOOD CULTURE FOR BACTERIA: CPT | Performed by: EMERGENCY MEDICINE

## 2023-11-08 PROCEDURE — 2020000001 HC ICU ROOM DAILY

## 2023-11-08 PROCEDURE — 84484 ASSAY OF TROPONIN QUANT: CPT | Performed by: EMERGENCY MEDICINE

## 2023-11-08 PROCEDURE — 82435 ASSAY OF BLOOD CHLORIDE: CPT | Performed by: EMERGENCY MEDICINE

## 2023-11-08 PROCEDURE — 2500000004 HC RX 250 GENERAL PHARMACY W/ HCPCS (ALT 636 FOR OP/ED)

## 2023-11-08 PROCEDURE — 70450 CT HEAD/BRAIN W/O DYE: CPT | Performed by: RADIOLOGY

## 2023-11-08 PROCEDURE — 2500000004 HC RX 250 GENERAL PHARMACY W/ HCPCS (ALT 636 FOR OP/ED): Mod: SE | Performed by: EMERGENCY MEDICINE

## 2023-11-08 PROCEDURE — 96366 THER/PROPH/DIAG IV INF ADDON: CPT | Mod: 59

## 2023-11-08 PROCEDURE — 80165 DIPROPYLACETIC ACID FREE: CPT

## 2023-11-08 PROCEDURE — 82607 VITAMIN B-12: CPT

## 2023-11-08 PROCEDURE — 99223 1ST HOSP IP/OBS HIGH 75: CPT | Performed by: PSYCHIATRY & NEUROLOGY

## 2023-11-08 PROCEDURE — 99291 CRITICAL CARE FIRST HOUR: CPT | Mod: 25 | Performed by: EMERGENCY MEDICINE

## 2023-11-08 PROCEDURE — 96367 TX/PROPH/DG ADDL SEQ IV INF: CPT | Mod: 59

## 2023-11-08 PROCEDURE — 87086 URINE CULTURE/COLONY COUNT: CPT | Performed by: EMERGENCY MEDICINE

## 2023-11-08 PROCEDURE — 80171 DRUG SCREEN QUANT GABAPENTIN: CPT | Performed by: STUDENT IN AN ORGANIZED HEALTH CARE EDUCATION/TRAINING PROGRAM

## 2023-11-08 PROCEDURE — 83735 ASSAY OF MAGNESIUM: CPT | Performed by: EMERGENCY MEDICINE

## 2023-11-08 PROCEDURE — 2500000005 HC RX 250 GENERAL PHARMACY W/O HCPCS: Performed by: EMERGENCY MEDICINE

## 2023-11-08 PROCEDURE — 85610 PROTHROMBIN TIME: CPT | Performed by: EMERGENCY MEDICINE

## 2023-11-08 PROCEDURE — 83880 ASSAY OF NATRIURETIC PEPTIDE: CPT | Performed by: EMERGENCY MEDICINE

## 2023-11-08 PROCEDURE — 99291 CRITICAL CARE FIRST HOUR: CPT | Mod: 25

## 2023-11-08 PROCEDURE — 74018 RADEX ABDOMEN 1 VIEW: CPT | Performed by: RADIOLOGY

## 2023-11-08 PROCEDURE — 96365 THER/PROPH/DIAG IV INF INIT: CPT | Mod: 59

## 2023-11-08 PROCEDURE — 71045 X-RAY EXAM CHEST 1 VIEW: CPT

## 2023-11-08 PROCEDURE — 71045 X-RAY EXAM CHEST 1 VIEW: CPT | Mod: FY

## 2023-11-08 PROCEDURE — 83605 ASSAY OF LACTIC ACID: CPT

## 2023-11-08 PROCEDURE — 2500000005 HC RX 250 GENERAL PHARMACY W/O HCPCS: Mod: SE | Performed by: EMERGENCY MEDICINE

## 2023-11-08 PROCEDURE — 99291 CRITICAL CARE FIRST HOUR: CPT

## 2023-11-08 PROCEDURE — 85027 COMPLETE CBC AUTOMATED: CPT | Performed by: EMERGENCY MEDICINE

## 2023-11-08 PROCEDURE — 74018 RADEX ABDOMEN 1 VIEW: CPT | Mod: FY

## 2023-11-08 RX ORDER — DEXMEDETOMIDINE HYDROCHLORIDE 4 UG/ML
.2-1.5 INJECTION, SOLUTION INTRAVENOUS CONTINUOUS
Status: DISCONTINUED | OUTPATIENT
Start: 2023-11-08 | End: 2023-11-09

## 2023-11-08 RX ORDER — ATORVASTATIN CALCIUM 10 MG/1
10 TABLET, FILM COATED ORAL NIGHTLY
Status: DISCONTINUED | OUTPATIENT
Start: 2023-11-08 | End: 2023-11-14 | Stop reason: HOSPADM

## 2023-11-08 RX ORDER — CYCLOPENTOLATE HYDROCHLORIDE 10 MG/ML
1 SOLUTION/ DROPS OPHTHALMIC EVERY 12 HOURS
COMMUNITY

## 2023-11-08 RX ORDER — INSULIN LISPRO 100 [IU]/ML
0-5 INJECTION, SOLUTION INTRAVENOUS; SUBCUTANEOUS EVERY 4 HOURS
Status: DISCONTINUED | OUTPATIENT
Start: 2023-11-08 | End: 2023-11-14 | Stop reason: HOSPADM

## 2023-11-08 RX ORDER — INSULIN LISPRO 100 [IU]/ML
INJECTION, SOLUTION INTRAVENOUS; SUBCUTANEOUS
COMMUNITY

## 2023-11-08 RX ORDER — ARIPIPRAZOLE 30 MG/1
30 TABLET ORAL DAILY
COMMUNITY
Start: 2023-10-03 | End: 2023-11-14 | Stop reason: HOSPADM

## 2023-11-08 RX ORDER — CEFEPIME 1 G/50ML
1 INJECTION, SOLUTION INTRAVENOUS EVERY 12 HOURS
Status: DISCONTINUED | OUTPATIENT
Start: 2023-11-09 | End: 2023-11-08

## 2023-11-08 RX ORDER — CARVEDILOL 3.12 MG/1
3.12 TABLET ORAL
Status: ON HOLD | COMMUNITY
Start: 2023-09-26 | End: 2023-11-14 | Stop reason: SDUPTHER

## 2023-11-08 RX ORDER — SUCCINYLCHOLINE CHLORIDE 20 MG/ML
60 INJECTION INTRAMUSCULAR; INTRAVENOUS ONCE
Status: COMPLETED | OUTPATIENT
Start: 2023-11-08 | End: 2023-11-08

## 2023-11-08 RX ORDER — HALOPERIDOL 5 MG/1
5 TABLET ORAL EVERY 4 HOURS PRN
COMMUNITY

## 2023-11-08 RX ORDER — SERTRALINE HYDROCHLORIDE 25 MG/1
75 TABLET, FILM COATED ORAL
Status: ON HOLD | COMMUNITY
Start: 2023-06-26 | End: 2023-11-08 | Stop reason: ALTCHOICE

## 2023-11-08 RX ORDER — AMLODIPINE BESYLATE 10 MG/1
10 TABLET ORAL
Status: ON HOLD | COMMUNITY
Start: 2021-07-22 | End: 2023-11-08 | Stop reason: ALTCHOICE

## 2023-11-08 RX ORDER — LISINOPRIL 40 MG/1
40 TABLET ORAL
COMMUNITY
Start: 2022-01-23

## 2023-11-08 RX ORDER — LACTULOSE 10 G/15ML
20 SOLUTION ORAL DAILY
Status: DISCONTINUED | OUTPATIENT
Start: 2023-11-08 | End: 2023-11-08

## 2023-11-08 RX ORDER — ETOMIDATE 2 MG/ML
INJECTION INTRAVENOUS
Status: COMPLETED
Start: 2023-11-08 | End: 2023-11-08

## 2023-11-08 RX ORDER — DONEPEZIL HYDROCHLORIDE 10 MG/1
10 TABLET, FILM COATED ORAL NIGHTLY
Status: DISCONTINUED | OUTPATIENT
Start: 2023-11-08 | End: 2023-11-09

## 2023-11-08 RX ORDER — ATORVASTATIN CALCIUM 10 MG/1
1 TABLET, FILM COATED ORAL NIGHTLY
COMMUNITY
Start: 2021-11-19

## 2023-11-08 RX ORDER — PANTOPRAZOLE SODIUM 40 MG/10ML
40 INJECTION, POWDER, LYOPHILIZED, FOR SOLUTION INTRAVENOUS DAILY
Status: DISCONTINUED | OUTPATIENT
Start: 2023-11-08 | End: 2023-11-09

## 2023-11-08 RX ORDER — HYDROXYZINE HYDROCHLORIDE 50 MG/1
50 TABLET, FILM COATED ORAL EVERY 6 HOURS PRN
COMMUNITY

## 2023-11-08 RX ORDER — PYRIDOXINE HCL (VITAMIN B6) 100 MG
100 TABLET ORAL 2 TIMES DAILY
COMMUNITY

## 2023-11-08 RX ORDER — LACTULOSE 10 G/15ML
30 SOLUTION ORAL 3 TIMES DAILY
COMMUNITY
End: 2023-11-14 | Stop reason: HOSPADM

## 2023-11-08 RX ORDER — FERROUS SULFATE 325(65) MG
1 TABLET ORAL
COMMUNITY
Start: 2023-06-26

## 2023-11-08 RX ORDER — GABAPENTIN 100 MG/1
200 CAPSULE ORAL 2 TIMES DAILY
Status: ON HOLD | COMMUNITY
Start: 2023-09-23 | End: 2023-11-14 | Stop reason: SDUPTHER

## 2023-11-08 RX ORDER — VANCOMYCIN HYDROCHLORIDE 1 G/200ML
1000 INJECTION, SOLUTION INTRAVENOUS ONCE
Status: COMPLETED | OUTPATIENT
Start: 2023-11-08 | End: 2023-11-08

## 2023-11-08 RX ORDER — INSULIN GLARGINE 100 [IU]/ML
32 INJECTION, SOLUTION SUBCUTANEOUS NIGHTLY
Status: ON HOLD | COMMUNITY
End: 2023-11-14 | Stop reason: SDUPTHER

## 2023-11-08 RX ORDER — FENTANYL CITRATE 50 UG/ML
25 INJECTION, SOLUTION INTRAMUSCULAR; INTRAVENOUS ONCE
Status: COMPLETED | OUTPATIENT
Start: 2023-11-08 | End: 2023-11-08

## 2023-11-08 RX ORDER — DIVALPROEX SODIUM 125 MG/1
250 CAPSULE, COATED PELLETS ORAL
Status: DISCONTINUED | OUTPATIENT
Start: 2023-11-08 | End: 2023-11-08

## 2023-11-08 RX ORDER — OXCARBAZEPINE 300 MG/1
300 TABLET, FILM COATED ORAL 2 TIMES DAILY
Status: DISCONTINUED | OUTPATIENT
Start: 2023-11-08 | End: 2023-11-09

## 2023-11-08 RX ORDER — DIVALPROEX SODIUM 125 MG/1
250 CAPSULE, COATED PELLETS ORAL 2 TIMES DAILY
COMMUNITY
Start: 2023-06-27

## 2023-11-08 RX ORDER — TAMSULOSIN HYDROCHLORIDE 0.4 MG/1
0.4 CAPSULE ORAL
COMMUNITY
Start: 2021-11-18

## 2023-11-08 RX ORDER — PROPOFOL 10 MG/ML
INJECTION, EMULSION INTRAVENOUS
Status: COMPLETED
Start: 2023-11-08 | End: 2023-11-08

## 2023-11-08 RX ORDER — FENTANYL CITRATE-0.9 % NACL/PF 10 MCG/ML
25-200 PLASTIC BAG, INJECTION (ML) INTRAVENOUS CONTINUOUS
Status: DISCONTINUED | OUTPATIENT
Start: 2023-11-08 | End: 2023-11-09

## 2023-11-08 RX ORDER — ACETAMINOPHEN 325 MG/1
650 TABLET ORAL EVERY 8 HOURS PRN
COMMUNITY

## 2023-11-08 RX ORDER — DONEPEZIL HYDROCHLORIDE 10 MG/1
10 TABLET, FILM COATED ORAL NIGHTLY
COMMUNITY

## 2023-11-08 RX ORDER — GABAPENTIN 100 MG/1
200 CAPSULE ORAL 2 TIMES DAILY
Status: DISCONTINUED | OUTPATIENT
Start: 2023-11-08 | End: 2023-11-09

## 2023-11-08 RX ORDER — ARIPIPRAZOLE 15 MG/1
30 TABLET ORAL DAILY
Status: DISCONTINUED | OUTPATIENT
Start: 2023-11-08 | End: 2023-11-09

## 2023-11-08 RX ORDER — OMEPRAZOLE 20 MG/1
20 CAPSULE, DELAYED RELEASE ORAL
COMMUNITY

## 2023-11-08 RX ORDER — POLYVINYL ALCOHOL 14 MG/ML
2 SOLUTION/ DROPS OPHTHALMIC EVERY 8 HOURS
COMMUNITY

## 2023-11-08 RX ORDER — DEXTROSE MONOHYDRATE 100 MG/ML
0.3 INJECTION, SOLUTION INTRAVENOUS ONCE AS NEEDED
Status: DISCONTINUED | OUTPATIENT
Start: 2023-11-08 | End: 2023-11-14 | Stop reason: HOSPADM

## 2023-11-08 RX ORDER — AMOXICILLIN 250 MG
1 CAPSULE ORAL 2 TIMES DAILY
Status: ON HOLD | COMMUNITY
Start: 2023-06-26 | End: 2023-11-08 | Stop reason: ALTCHOICE

## 2023-11-08 RX ORDER — PREDNISOLONE ACETATE 10 MG/ML
1 SUSPENSION/ DROPS OPHTHALMIC 3 TIMES DAILY
COMMUNITY
End: 2023-12-13 | Stop reason: SDUPTHER

## 2023-11-08 RX ORDER — AMMONIUM LACTATE 12 G/100G
1 CREAM TOPICAL 2 TIMES DAILY PRN
COMMUNITY

## 2023-11-08 RX ORDER — ETOMIDATE 2 MG/ML
INJECTION INTRAVENOUS
Status: COMPLETED | OUTPATIENT
Start: 2023-11-08 | End: 2023-11-08

## 2023-11-08 RX ORDER — DEXTROSE 50 % IN WATER (D50W) INTRAVENOUS SYRINGE
25
Status: DISCONTINUED | OUTPATIENT
Start: 2023-11-08 | End: 2023-11-14 | Stop reason: HOSPADM

## 2023-11-08 RX ORDER — INSULIN LISPRO 100 [IU]/ML
2-10 INJECTION, SOLUTION INTRAVENOUS; SUBCUTANEOUS
Status: ON HOLD | COMMUNITY
End: 2023-11-08 | Stop reason: ALTCHOICE

## 2023-11-08 RX ORDER — TRAZODONE HYDROCHLORIDE 50 MG/1
25 TABLET ORAL DAILY PRN
COMMUNITY
Start: 2023-06-26

## 2023-11-08 RX ORDER — NALOXONE HYDROCHLORIDE 1 MG/ML
INJECTION INTRAMUSCULAR; INTRAVENOUS; SUBCUTANEOUS
Status: COMPLETED
Start: 2023-11-08 | End: 2023-11-08

## 2023-11-08 RX ORDER — SUCCINYLCHOLINE CHLORIDE 20 MG/ML
INJECTION INTRAMUSCULAR; INTRAVENOUS CODE/TRAUMA/SEDATION MEDICATION
Status: COMPLETED | OUTPATIENT
Start: 2023-11-08 | End: 2023-11-08

## 2023-11-08 RX ORDER — HEPARIN SODIUM 5000 [USP'U]/ML
5000 INJECTION, SOLUTION INTRAVENOUS; SUBCUTANEOUS EVERY 8 HOURS
Status: DISCONTINUED | OUTPATIENT
Start: 2023-11-08 | End: 2023-11-14 | Stop reason: HOSPADM

## 2023-11-08 RX ORDER — VALPROIC ACID 250 MG/5ML
250 SOLUTION ORAL EVERY 12 HOURS
Status: DISCONTINUED | OUTPATIENT
Start: 2023-11-08 | End: 2023-11-14 | Stop reason: HOSPADM

## 2023-11-08 RX ORDER — CEFEPIME 1 G/50ML
1 INJECTION, SOLUTION INTRAVENOUS EVERY 24 HOURS
Status: DISCONTINUED | OUTPATIENT
Start: 2023-11-08 | End: 2023-11-08

## 2023-11-08 RX ORDER — FENTANYL CITRATE 50 UG/ML
25 INJECTION, SOLUTION INTRAMUSCULAR; INTRAVENOUS ONCE
Status: DISCONTINUED | OUTPATIENT
Start: 2023-11-08 | End: 2023-11-08

## 2023-11-08 RX ORDER — TALC
1 POWDER (GRAM) TOPICAL NIGHTLY
COMMUNITY
Start: 2023-06-26

## 2023-11-08 RX ORDER — PROPOFOL 10 MG/ML
5-20 INJECTION, EMULSION INTRAVENOUS CONTINUOUS
Status: DISCONTINUED | OUTPATIENT
Start: 2023-11-08 | End: 2023-11-09

## 2023-11-08 RX ORDER — CHOLECALCIFEROL (VITAMIN D3) 50 MCG
2000 TABLET ORAL DAILY
COMMUNITY

## 2023-11-08 RX ORDER — FUROSEMIDE 20 MG/1
20 TABLET ORAL DAILY
COMMUNITY
Start: 2023-09-29 | End: 2024-01-18 | Stop reason: ALTCHOICE

## 2023-11-08 RX ORDER — FENTANYL CITRATE-0.9 % NACL/PF 10 MCG/ML
PLASTIC BAG, INJECTION (ML) INTRAVENOUS
Status: COMPLETED
Start: 2023-11-08 | End: 2023-11-08

## 2023-11-08 RX ORDER — OXCARBAZEPINE 150 MG/1
300 TABLET, FILM COATED ORAL 2 TIMES DAILY
COMMUNITY
End: 2023-11-14 | Stop reason: HOSPADM

## 2023-11-08 RX ORDER — SUCCINYLCHOLINE CHLORIDE 20 MG/ML
INJECTION INTRAMUSCULAR; INTRAVENOUS
Status: COMPLETED
Start: 2023-11-08 | End: 2023-11-08

## 2023-11-08 RX ORDER — ETOMIDATE 2 MG/ML
20 INJECTION INTRAVENOUS ONCE
Status: COMPLETED | OUTPATIENT
Start: 2023-11-08 | End: 2023-11-08

## 2023-11-08 RX ORDER — VANCOMYCIN HYDROCHLORIDE 1 G/200ML
INJECTION, SOLUTION INTRAVENOUS
Status: COMPLETED
Start: 2023-11-08 | End: 2023-11-08

## 2023-11-08 RX ORDER — IBUPROFEN 600 MG/1
600 TABLET ORAL EVERY 6 HOURS PRN
COMMUNITY
Start: 2023-06-26 | End: 2023-11-14 | Stop reason: HOSPADM

## 2023-11-08 RX ADMIN — ETOMIDATE 20 MG: 2 INJECTION INTRAVENOUS at 09:14

## 2023-11-08 RX ADMIN — SODIUM CHLORIDE 1000 ML: 9 INJECTION, SOLUTION INTRAVENOUS at 10:08

## 2023-11-08 RX ADMIN — SODIUM CHLORIDE, POTASSIUM CHLORIDE, SODIUM LACTATE AND CALCIUM CHLORIDE 1000 ML: 600; 310; 30; 20 INJECTION, SOLUTION INTRAVENOUS at 18:33

## 2023-11-08 RX ADMIN — PROPOFOL 10 MCG/KG/MIN: 10 INJECTION, EMULSION INTRAVENOUS at 09:32

## 2023-11-08 RX ADMIN — Medication 25 MCG/HR: at 09:46

## 2023-11-08 RX ADMIN — FENTANYL CITRATE 25 MCG: 50 INJECTION, SOLUTION INTRAMUSCULAR; INTRAVENOUS at 09:45

## 2023-11-08 RX ADMIN — VANCOMYCIN HYDROCHLORIDE 1000 MG: 1 INJECTION, SOLUTION INTRAVENOUS at 11:43

## 2023-11-08 RX ADMIN — VALPROIC ACID 250 MG: 250 SOLUTION ORAL at 21:30

## 2023-11-08 RX ADMIN — HEPARIN SODIUM 5000 UNITS: 5000 INJECTION INTRAVENOUS; SUBCUTANEOUS at 16:46

## 2023-11-08 RX ADMIN — VANCOMYCIN HYDROCHLORIDE 1000 MG: 1 INJECTION, SOLUTION INTRAVENOUS at 09:33

## 2023-11-08 RX ADMIN — SODIUM CHLORIDE 1000 ML: 0.9 INJECTION, SOLUTION INTRAVENOUS at 11:40

## 2023-11-08 RX ADMIN — SUCCINYLCHOLINE CHLORIDE 60 MG: 20 INJECTION INTRAMUSCULAR; INTRAVENOUS at 09:16

## 2023-11-08 RX ADMIN — PROPOFOL 20 MCG/KG/MIN: 10 INJECTION, EMULSION INTRAVENOUS at 13:21

## 2023-11-08 RX ADMIN — PIPERACILLIN SODIUM AND TAZOBACTAM SODIUM 4.5 G: 4; .5 INJECTION, SOLUTION INTRAVENOUS at 09:33

## 2023-11-08 RX ADMIN — SUCCINYLCHOLINE CHLORIDE 60 MG: 20 INJECTION, SOLUTION INTRAMUSCULAR; INTRAVENOUS at 09:16

## 2023-11-08 RX ADMIN — SUCCINYLCHOLINE CHLORIDE 60 MG: 20 INJECTION, SOLUTION INTRAMUSCULAR; INTRAVENOUS at 09:15

## 2023-11-08 RX ADMIN — PANTOPRAZOLE SODIUM 40 MG: 40 INJECTION, POWDER, FOR SOLUTION INTRAVENOUS at 17:56

## 2023-11-08 RX ADMIN — Medication 40 PERCENT: at 14:45

## 2023-11-08 RX ADMIN — Medication 100 MCG/HR: at 18:34

## 2023-11-08 RX ADMIN — NALOXONE HYDROCHLORIDE 2 MG: 1 INJECTION, SOLUTION INTRAMUSCULAR; INTRAVENOUS; SUBCUTANEOUS at 09:10

## 2023-11-08 ASSESSMENT — ACTIVITIES OF DAILY LIVING (ADL)
JUDGMENT_ADEQUATE_SAFELY_COMPLETE_DAILY_ACTIVITIES: UNABLE TO ASSESS
FEEDING YOURSELF: UNABLE TO ASSESS
DRESSING YOURSELF: UNABLE TO ASSESS
HEARING - RIGHT EAR: UNABLE TO ASSESS
BATHING: UNABLE TO ASSESS
TOILETING: UNABLE TO ASSESS
HEARING - LEFT EAR: UNABLE TO ASSESS
GROOMING: UNABLE TO ASSESS
WALKS IN HOME: UNABLE TO ASSESS
PATIENT'S MEMORY ADEQUATE TO SAFELY COMPLETE DAILY ACTIVITIES?: UNABLE TO ASSESS
ADEQUATE_TO_COMPLETE_ADL: UNABLE TO ASSESS

## 2023-11-08 ASSESSMENT — PAIN - FUNCTIONAL ASSESSMENT
PAIN_FUNCTIONAL_ASSESSMENT: CPOT (CRITICAL CARE PAIN OBSERVATION TOOL)
PAIN_FUNCTIONAL_ASSESSMENT: 0-10
PAIN_FUNCTIONAL_ASSESSMENT: CPOT (CRITICAL CARE PAIN OBSERVATION TOOL)

## 2023-11-08 ASSESSMENT — LIFESTYLE VARIABLES: REASON UNABLE TO ASSESS: NO

## 2023-11-08 ASSESSMENT — PAIN DESCRIPTION - PROGRESSION: CLINICAL_PROGRESSION: NOT CHANGED

## 2023-11-08 NOTE — ED PROVIDER NOTES
History & Physical    CC: Unresponsive  HPI:  67 y.o. male with history of diabetes, schizophrenia, lives in a nursing home who presents emergency department today via EMS.  History is obtained from EMS.  Nursing home staff note that they were rounding on the patient this morning noted he was unresponsive and called EMS.  When they arrived he had shallow respirations and was placed on oxygen.  They were unable to obtain any pulse ox in the field.  Patient blood glucose was normal.     On arrival, the patient was unresponsive but breathing.  He was on a nonrebreather from EMS with saturations reading in the high 90s to 100.    Additional Limitations to History:  Unresponsive  External Records Reviewed: I reviewed recent and relevant outside records including nursing home records  History Obtained From: EMS    Past Medical History: Per HPI  Medications: Reviewed in EMR and with patient  Allergies: No Known Allergies  Past Surgical History: Unable to obtain secondary to patient condition  Social History: Lives in a nursing home    ------------------------------------------------------------------------------------------------------  Physical Exam:  --Vital signs reviewed: T 35.4 °C (95.7 °F)  HR 56  BP    RR 18  O2      GEN: Patient is unresponsive.  He occasionally slowly opens his eyes, but does not follow commands, does not respond to noxious stimuli.    EYES: Pupils are unequal, right-sided pupil is 1 mm, left side is 3 mm.    ENT: Moist mucous membranes, no apparent injuries or lesions.   CARDIO: Normal rate and regular rhythm.  2+ equal pulses of the distal extremities.   PULM: Clear to auscultation bilaterally. Good symmetric chest expansion.  GI: Soft, non-tender, non-distended. No rebound tenderness or guarding.  SKIN: Warm and dry, no rashes or lesions.  MSK: ROM intact the extremities without contractures.   EXT: No peripheral edema, contusions, or wounds.   NEURO: Unresponsive to noxious stimuli.  Does  spontaneously open his eyes at times.  PSYCH: Unable to assess  -------------------------------------------------------------------------------------------------------    Medical Decision Making   Patient seen and evaluated by ED attending. On arrival the patient is seen and evaluated in the resuscitation bay.  On arrival, the patient is unresponsive to noxious stimuli, but he is breathing on his own and vitals are otherwise within normal limits.  Patient blood glucose is 124.  He was given 2 mg IV Narcan without any effect.  Patient's temperature was measured to be 35.4.  Differential diagnosis at this time includes altered mentation secondary to infection, respiratory compromise, intracranial bleed, stroke, other etiology.      Given the patient's inability to protect his airway, decision was made to intubate the patient.  Patient was intubated without complication, see procedure note.  EKG was performed, CT scan of the head was ordered.  Labs are obtained, patient is started on a propofol drip for sedation.  He is covered broadly with antibiotics given his temperature and unclear etiology of his presentation.    - EKG interpreted by myself (ED attending physician): 94 bpm. Normal sinus. No ST or T wave changes with ischemia. Unchanged morphology from piror.   - I independently interpreted: no acute process. ET tube in the right mainstem.   - Labs notable for mild ANNETTE with Cr of 2.23, otherwise largely unremarkable     ED Course as of 11/09/23 1300   Wed Nov 08, 2023   0927 Troponin I, High Sensitivity [CB]      ED Course User Index  [CB] Art Prado DO         Diagnoses as of 11/09/23 1300   Unresponsive   Acute respiratory failure, unspecified whether with hypoxia or hypercapnia (CMS/HCC)     Patient remained sedated and intubated in the ED. Unclear etiology of the patient's altered mentation at this time. Labs workup is largely unremarkable and does not explain the clinical picture. UA still pending.  Patient covered broadly with antibiotics.  CT head negative for any acute findings.     Discussed the case with the MICU attending, who accepts the patient for admission. Neurology consult also placed, and discussed the patient with the neurology team on the phone.     Escalation of Care: Appropriate for MICU admission   Discussion of Management with Other Providers:  I discussed the patient/results with: Admitting Team, Consult Physician, and Respiratory Therapy       Ok Andrews MD   Attending Physician      Ok Andrews MD MPH  11/09/23 6976

## 2023-11-08 NOTE — ED PROCEDURE NOTE
Procedure  Intubation    Performed by: Art Prado DO  Authorized by: Ok Andrews MD MPH    Consent:     Consent obtained:  Emergent situation  Universal protocol:     Relevant documents present and verified: yes      Test results available: no      Imaging studies available: no    Pre-procedure details:     Indications: airway protection      Patient status:  Altered mental status    Look externally: no concerns      Obstruction: none      Neck mobility: normal      Pharmacologic strategy: RSI      Induction agents:  Etomidate    Paralytics:  Succinylcholine  Procedure details:     Preoxygenation:  Bag valve mask    CPR in progress: no      Number of attempts:  1  Successful intubation attempt details:     Intubation method:  Oral    Intubation technique: video assisted      Laryngoscope blade:  Hypercurved and Mac 3    Bougie used: no      Grade view: I      Tube size (mm):  8.0    Tube type:  Cuffed    Tube visualized through cords: yes    Placement assessment:     ETT at teeth/gumline (cm):  26    Tube secured with:  ETT ricks    Breath sounds:  Equal and absent over the epigastrium    Placement verification: chest rise, colorimetric ETCO2, CXR verification, direct visualization, equal breath sounds, tube exhalation and waveform ETCO2      CXR findings:  Appropriate position  Post-procedure details:     Procedure completion:  Tolerated well, no immediate complications               Art Prado DO  Resident  11/08/23 6836      I was present for key portions of the procedure.    Ok Andrews MD  ED Attending Physician      Ok Andrews MD MPH  11/08/23 5825

## 2023-11-08 NOTE — PROGRESS NOTES
Pharmacy Medication History Review    Espinoza Ross is a 67 y.o. male admitted for Unresponsive. Pharmacy reviewed the patient's [id number] medications and allergies for accuracy.    The list below reflectives the updated PTA list. Please review each medication in order reconciliation for additional clarification and justification.  Medications Prior to Admission   Medication Sig Dispense Refill Last Dose    ARIPiprazole (Abilify) 30 mg tablet Take 1 tablet (30 mg) by mouth once daily.       atorvastatin (Lipitor) 10 mg tablet Take 1 tablet (10 mg) by mouth once daily at bedtime.       carvedilol (Coreg) 3.125 mg tablet Take 1 tablet (3.125 mg) by mouth 2 times a day with meals.       divalproex sprinkle (Depakote Sprinkle) 125 mg DR capsule Take 2 capsules (250 mg) by mouth once daily.       ferrous sulfate 325 (65 Fe) MG tablet Take 1 tablet (325 mg) by mouth 2 times a day with meals.       furosemide (Lasix) 20 mg tablet Take 1 tablet (20 mg) by mouth once daily.       gabapentin (Neurontin) 100 mg capsule Take 2 capsules (200 mg) by mouth 2 times a day.       lisinopril 40 mg tablet Take 1 tablet (40 mg) by mouth once daily.       melatonin 3 mg tablet Take 1 tablet (3 mg) by mouth once daily at bedtime.       sertraline (Zoloft) 25 mg tablet Take 3 tablets (75 mg) by mouth once daily.       tamsulosin (Flomax) 0.4 mg 24 hr capsule Take 1 capsule (0.4 mg) by mouth once daily.       traZODone (Desyrel) 50 mg tablet Take 0.5 tablets (25 mg) by mouth once daily as needed.       acetaminophen (Tylenol 8 HOUR) 650 mg ER tablet Take 2 tablets (650 mg) by mouth every 8 hours if needed for mild pain (1 - 3). Do not crush, chew, or split.       ammonium lactate (Amlactin) 12 % cream Apply 1 Application topically 2 times a day as needed for dry skin.       [] azithromycin (Zithromax Z-Jason) 250 mg tablet Take 1 tablet (250 mg) by mouth once daily for 5 days. 5 tablet 0     chlorhexidine (Peridex) 0.12 %  solution Use 15 mL in the mouth or throat 3 times a day. 118 mL 0     cholecalciferol (Vitamin D-3) 5,000 Units tablet Take 2,000 Units by mouth once daily.       cyclopentolate (Cyclogyl) 1 % ophthalmic solution Administer 1 drop into the left eye every 12 hours.       donepezil (Aricept) 10 mg tablet Take 1 tablet (10 mg) by mouth once daily at bedtime.       haloperidol (Haldol) 5 mg tablet Take 1 tablet (5 mg) by mouth every 4 hours if needed for agitation (behaviors).       hydrOXYzine HCL (Atarax) 50 mg tablet Take 1 tablet (50 mg) by mouth every 6 hours if needed for anxiety.       insulin glargine (Lantus U-100 Insulin) 100 unit/mL injection Inject 32 Units under the skin once daily at bedtime. Take as directed per insulin instructions.       insulin lispro (HumaLOG KwikPen Insulin) 100 unit/mL injection Inject 2-10 Units under the skin 4 times a day before meals. Sliding scale:  150-200= 2 units  201-250= 4 units  251-300= 6 units  301-350= 8 units  351-400= 10 units  If > 401 then call NP/MD       lactulose 10 gram/15 mL (15 mL) solution Take 30 mL by mouth 3 times a day as needed (constipation).       omeprazole (PriLOSEC) 20 mg DR capsule Take 1 capsule (20 mg) by mouth once daily in the morning. Take before meals. Do not crush or chew.       OXcarbazepine (Trileptal) 150 mg tablet Take 2 tablets (300 mg) by mouth 2 times a day.       polyvinyl alcohol (Liquifilm Tears) 1.4 % ophthalmic solution Administer 1 drop into both eyes every 8 hours.       prednisoLONE acetate (Pred-Forte) 1 % ophthalmic suspension Administer 1 drop into the left eye 3 times a day.       pyridoxine (Vitamin B-6) 100 mg tablet Take 1 tablet (100 mg) by mouth 2 times a day.           The list below reflectives the updated allergy list. Please review each documented allergy for additional clarification and justification.  Allergies  Reviewed by Art Prado DO on 11/8/2023        Severity Reactions Comments    Penicillins Not  Specified Unknown             Below are additional concerns with the patient's PTA list.  Medication list updated using SNF records ([facility])- chart was transferred with patient from the ED    Joya Red, AmbarD, BCCCP

## 2023-11-08 NOTE — SIGNIFICANT EVENT
"Preliminary EEG Report    This vEEG is indicative of severe diffuse encephalopathy. No epileptiform abnormalities or lateralizing signs noted.     This EEG was read up until 18:19 on 11/08/23, which includes the first 25 minutes of EEG recording.     Please see the full report in the EEG database and \"Media\" tab tomorrow for the interpretation of the remainder of the recording.    When ready to discontinue EEG, please enter \"Discontinue Continuous Video EEG\" order.    Timbo Arita MD  Clinical Epilepsy Fellow    "
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
## 2023-11-08 NOTE — PROGRESS NOTES
"Vancomycin Dosing by Pharmacy- INITIAL    Espinoza Ross is a 67 y.o. year old male who Pharmacy has been consulted for vancomycin dosing for other sepsis . Based on the patient's indication and renal status this patient will be dosed based on a goal trough/random level of 15-20.     Renal function is currently declining, ANNETTE-- dose by levels    Visit Vitals  /75   Pulse (!) 47   Temp 35.4 °C (95.7 °F)   Resp 18        Lab Results   Component Value Date    CREATININE 2.23 (H) 11/08/2023    CREATININE 0.85 10/18/2023    CREATININE 0.80 08/02/2023    CREATININE 0.73 01/24/2022    CREATININE 0.59 01/22/2022    CREATININE 0.68 01/21/2022        Patient weight is No results found for: \"PTWEIGHT\"    No results found for: \"CULTURE\"     No intake/output data recorded.  [unfilled]    Lab Results   Component Value Date    PATIENTTEMP 37.0 11/08/2023    PATIENTTEMP 37.0 11/08/2023    PATIENTTEMP 37.0 10/18/2023          Assessment/Plan     Patient will be given a loading dose of 2000 mg.  Will not order scheduled vanco, dose by levels    Follow-up level will be ordered on 11/9 at 1000 unless clinically indicated sooner.  Will continue to monitor renal function daily while on vancomycin and order serum creatinine at least every 48 hours if not already ordered.  Follow for continued vancomycin needs, clinical response, and signs/symptoms of toxicity.       Kelsey Crenshaw, PharmD       "
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
## 2023-11-08 NOTE — ED NOTES
Pharmacy Medication History Review    Espinoza Ross is a 67 y.o. male admitted for Unresponsive. Pharmacy reviewed the patient's kthfc-[id number] medications and allergies for accuracy.    The list below reflects the updated PTA list. Comments regarding how patient may be taking medications differently can be found in the Admit Orders Activity  Prior to Admission Medications   Prescriptions Last Dose Informant Patient Reported? Taking?   ARIPiprazole (Abilify) 30 mg tablet Past Week Care Giver Yes Yes   Sig: Take 1 tablet (30 mg) by mouth once daily.   OXcarbazepine (Trileptal) 150 mg tablet  Care Giver Yes No   Sig: Take 2 tablets (300 mg) by mouth 2 times a day.   acetaminophen (Tylenol 8 HOUR) 650 mg ER tablet  Care Giver Yes No   Sig: Take 2 tablets (650 mg) by mouth every 8 hours if needed for mild pain (1 - 3). Do not crush, chew, or split.   ammonium lactate (Amlactin) 12 % cream  Care Giver Yes No   Sig: Apply 1 Application topically 2 times a day as needed for dry skin.   atorvastatin (Lipitor) 10 mg tablet  Care Giver Yes Yes   Sig: Take 1 tablet (10 mg) by mouth once daily at bedtime.   azithromycin (Zithromax Z-Jason) 250 mg tablet   No No   Sig: Take 1 tablet (250 mg) by mouth once daily for 5 days.   carvedilol (Coreg) 3.125 mg tablet  Care Giver Yes Yes   Sig: Take 1 tablet (3.125 mg) by mouth 2 times a day with meals.   chlorhexidine (Peridex) 0.12 % solution Past Month Care Giver No No   Sig: Use 15 mL in the mouth or throat 3 times a day.   cholecalciferol (Vitamin D-3) 50 MCG (2000 UT) tablet  Care Giver Yes No   Sig: Take 1 tablet (2,000 Units) by mouth once daily.   cyclopentolate (Cyclogyl) 1 % ophthalmic solution  Care Giver Yes No   Sig: Administer 1 drop into the left eye every 12 hours.   divalproex sprinkle (Depakote Sprinkle) 125 mg DR capsule  Care Giver Yes Yes   Sig: Take 2 capsules (250 mg) by mouth 2 times a day.   donepezil (Aricept) 10 mg tablet  Care Giver Yes No   Sig: Take 1  tablet (10 mg) by mouth once daily at bedtime.   ferrous sulfate 325 (65 Fe) MG tablet  Care Giver Yes Yes   Sig: Take 1 tablet (325 mg) by mouth 2 times a day with meals.   furosemide (Lasix) 20 mg tablet  Care Giver Yes Yes   Sig: Take 1 tablet (20 mg) by mouth once daily.   gabapentin (Neurontin) 100 mg capsule  Care Giver Yes Yes   Sig: Take 2 capsules (200 mg) by mouth 2 times a day.   haloperidol (Haldol) 5 mg tablet  Care Giver Yes No   Sig: Take 1 tablet (5 mg) by mouth every 4 hours if needed for agitation (behaviors).   hydrOXYzine HCL (Atarax) 50 mg tablet  Care Giver Yes No   Sig: Take 1 tablet (50 mg) by mouth every 6 hours if needed for anxiety.   ibuprofen 600 mg tablet   Yes Yes   Sig: Take 1 tablet (600 mg) by mouth every 6 hours if needed for mild pain (1 - 3).   insulin glargine (Lantus U-100 Insulin) 100 unit/mL injection  Care Giver Yes No   Sig: Inject 32 Units under the skin once daily at bedtime. Take as directed per insulin instructions.   insulin lispro (HumaLOG) 100 unit/mL injection   Yes No   Sig: Inject under the skin 3 times a day with meals. Take as directed per insulin instructions (sliding scale):   150-200= 2 units  201-250= 4 units  251- 300=6 units   301-350= 8 units  351-400= 10 units   lactulose 10 gram/15 mL (15 mL) solution  Care Giver Yes No   Sig: Take 30 mL by mouth 3 times a day.   lisinopril 40 mg tablet  Care Giver Yes Yes   Sig: Take 1 tablet (40 mg) by mouth once daily.   melatonin 3 mg tablet  Care Giver Yes Yes   Sig: Take 1 tablet (3 mg) by mouth once daily at bedtime.   omeprazole (PriLOSEC) 20 mg DR capsule  Care Giver Yes No   Sig: Take 1 capsule (20 mg) by mouth once daily in the morning. Take before meals. Do not crush or chew.   polyvinyl alcohol (Liquifilm Tears) 1.4 % ophthalmic solution  Care Giver Yes No   Sig: Administer 2 drops into both eyes every 8 hours.   prednisoLONE acetate (Pred-Forte) 1 % ophthalmic suspension  Care Giver Yes No   Sig:  Administer 1 drop into the left eye 3 times a day.   pyridoxine (Vitamin B-6) 100 mg tablet  Care Giver Yes No   Sig: Take 1 tablet (100 mg) by mouth 2 times a day.   tamsulosin (Flomax) 0.4 mg 24 hr capsule  Care Giver Yes Yes   Sig: Take 1 capsule (0.4 mg) by mouth once daily.   traZODone (Desyrel) 50 mg tablet 11/7/2023 Care Giver Yes Yes   Sig: Take 0.5 tablets (25 mg) by mouth once daily as needed.      Facility-Administered Medications: None          The list below reflects the updated allergy list. Please review each documented allergy for additional clarification and justification.  Allergies  Reviewed by Art Prado DO on 11/8/2023        Severity Reactions Comments    Penicillins Not Specified Unknown             Patient declines M2B at discharge. Patient came from [facility] ([facility]).    Sources used to complete the med history include:  Medication list from [facility] facility    Below are additional concerns with the patient's PTA list.  N/A    Ambar AdamesD  PGY-1 Pharmacy Resident   [facility]  Please reach out via Secure Chat for questions, or if no response call [employer] or [employer]      ADDENDUM:  Revised some of PTA- please review  Verified all information represents best possible medication history   Trini Linton PharmD, Transitions of Care Pharmacist  Transitions of Care Pharmacist  Medication reconciliation complete  Please reach out via [employer] chat for questions, or if no response call [employer] or [employer]   Meds Amb[facility] Services

## 2023-11-08 NOTE — PROGRESS NOTES
Pharmacy Medication History Review    Espinoza Ross is a 67 y.o. male admitted for Unresponsive. Pharmacy reviewed the patient's udryr-[id number] medications and allergies for accuracy.    The list below reflectives the updated PTA list. Please review each medication in order reconciliation for additional clarification and justification.  Medications Prior to Admission   Medication Sig Dispense Refill Last Dose    ARIPiprazole (Abilify) 30 mg tablet Take 1 tablet (30 mg) by mouth once daily.       atorvastatin (Lipitor) 10 mg tablet Take 1 tablet (10 mg) by mouth once daily at bedtime.       carvedilol (Coreg) 3.125 mg tablet Take 1 tablet (3.125 mg) by mouth 2 times a day with meals.       divalproex sprinkle (Depakote Sprinkle) 125 mg DR capsule Take 2 capsules (250 mg) by mouth once daily.       ferrous sulfate 325 (65 Fe) MG tablet Take 1 tablet (325 mg) by mouth 2 times a day with meals.       furosemide (Lasix) 20 mg tablet Take 1 tablet (20 mg) by mouth once daily.       gabapentin (Neurontin) 100 mg capsule Take 2 capsules (200 mg) by mouth 2 times a day.       lisinopril 40 mg tablet Take 1 tablet (40 mg) by mouth once daily.       melatonin 3 mg tablet Take 1 tablet (3 mg) by mouth once daily at bedtime.       sertraline (Zoloft) 25 mg tablet Take 3 tablets (75 mg) by mouth once daily.       tamsulosin (Flomax) 0.4 mg 24 hr capsule Take 1 capsule (0.4 mg) by mouth once daily.       traZODone (Desyrel) 50 mg tablet Take 0.5 tablets (25 mg) by mouth once daily as needed.       acetaminophen (Tylenol 8 HOUR) 650 mg ER tablet Take 2 tablets (650 mg) by mouth every 8 hours if needed for mild pain (1 - 3). Do not crush, chew, or split.       ammonium lactate (Amlactin) 12 % cream Apply 1 Application topically 2 times a day as needed for dry skin.       [] azithromycin (Zithromax Z-Jason) 250 mg tablet Take 1 tablet (250 mg) by mouth once daily for 5 days. 5 tablet 0     chlorhexidine (Peridex) 0.12 %  solution Use 15 mL in the mouth or throat 3 times a day. 118 mL 0     cholecalciferol (Vitamin D-3) 5,000 Units tablet Take 2,000 Units by mouth once daily.       cyclopentolate (Cyclogyl) 1 % ophthalmic solution Administer 1 drop into the left eye every 12 hours.       donepezil (Aricept) 10 mg tablet Take 1 tablet (10 mg) by mouth once daily at bedtime.       haloperidol (Haldol) 5 mg tablet Take 1 tablet (5 mg) by mouth every 4 hours if needed for agitation (behaviors).       hydrOXYzine HCL (Atarax) 50 mg tablet Take 1 tablet (50 mg) by mouth every 6 hours if needed for anxiety.       insulin glargine (Lantus U-100 Insulin) 100 unit/mL injection Inject 32 Units under the skin once daily at bedtime. Take as directed per insulin instructions.       insulin lispro (HumaLOG KwikPen Insulin) 100 unit/mL injection Inject 2-10 Units under the skin 4 times a day before meals. Sliding scale:  150-200= 2 units  201-250= 4 units  251-300= 6 units  301-350= 8 units  351-400= 10 units  If > 401 then call NP/MD       lactulose 10 gram/15 mL (15 mL) solution Take 30 mL by mouth 3 times a day as needed (constipation).       omeprazole (PriLOSEC) 20 mg DR capsule Take 1 capsule (20 mg) by mouth once daily in the morning. Take before meals. Do not crush or chew.       OXcarbazepine (Trileptal) 150 mg tablet Take 2 tablets (300 mg) by mouth 2 times a day.       polyvinyl alcohol (Liquifilm Tears) 1.4 % ophthalmic solution Administer 1 drop into both eyes every 8 hours.       prednisoLONE acetate (Pred-Forte) 1 % ophthalmic suspension Administer 1 drop into the left eye 3 times a day.       pyridoxine (Vitamin B-6) 100 mg tablet Take 1 tablet (100 mg) by mouth 2 times a day.           The list below reflectives the updated allergy list. Please review each documented allergy for additional clarification and justification.  Allergies  Reviewed by Art Prado DO on 11/8/2023        Severity Reactions Comments    Penicillins Not  Specified Unknown             Below are additional concerns with the patient's PTA list.  Medication list updated using SNF records ([facility])- chart was transferred with patient from the ED    Joya Red, AmbarD, BCCCP

## 2023-11-09 ENCOUNTER — APPOINTMENT (OUTPATIENT)
Dept: CARDIOLOGY | Facility: HOSPITAL | Age: 68
DRG: 917 | End: 2023-11-09
Payer: MEDICARE

## 2023-11-09 LAB
ALBUMIN SERPL BCP-MCNC: 3 G/DL (ref 3.4–5)
ALBUMIN SERPL BCP-MCNC: 3.2 G/DL (ref 3.4–5)
ALP SERPL-CCNC: 108 U/L (ref 33–136)
ALT SERPL W P-5'-P-CCNC: 69 U/L (ref 10–52)
AMMONIA PLAS-SCNC: 30 UMOL/L (ref 16–53)
ANION GAP SERPL CALC-SCNC: 13 MMOL/L (ref 10–20)
ANION GAP SERPL CALC-SCNC: 15 MMOL/L (ref 10–20)
APTT PPP: 32 SECONDS (ref 27–38)
AST SERPL W P-5'-P-CCNC: 147 U/L (ref 9–39)
ATRIAL RATE: 94 BPM
BASOPHILS # BLD AUTO: 0.02 X10*3/UL (ref 0–0.1)
BASOPHILS NFR BLD AUTO: 0.4 %
BILIRUB DIRECT SERPL-MCNC: 0.9 MG/DL (ref 0–0.3)
BILIRUB SERPL-MCNC: 1.7 MG/DL (ref 0–1.2)
BUN SERPL-MCNC: 28 MG/DL (ref 6–23)
BUN SERPL-MCNC: 28 MG/DL (ref 6–23)
CALCIUM SERPL-MCNC: 8.3 MG/DL (ref 8.6–10.6)
CALCIUM SERPL-MCNC: 8.5 MG/DL (ref 8.6–10.6)
CHLORIDE SERPL-SCNC: 104 MMOL/L (ref 98–107)
CHLORIDE SERPL-SCNC: 106 MMOL/L (ref 98–107)
CHLORIDE UR-SCNC: 16 MMOL/L
CHLORIDE/CREATININE (MMOL/G) IN URINE: 6 MMOL/G CREAT (ref 23–275)
CO2 SERPL-SCNC: 22 MMOL/L (ref 21–32)
CO2 SERPL-SCNC: 26 MMOL/L (ref 21–32)
CREAT SERPL-MCNC: 1.7 MG/DL (ref 0.5–1.3)
CREAT SERPL-MCNC: 2.11 MG/DL (ref 0.5–1.3)
CREAT UR-MCNC: 253.9 MG/DL (ref 20–370)
EOSINOPHIL # BLD AUTO: 0.06 X10*3/UL (ref 0–0.7)
EOSINOPHIL NFR BLD AUTO: 1.1 %
ERYTHROCYTE [DISTWIDTH] IN BLOOD BY AUTOMATED COUNT: 13.7 % (ref 11.5–14.5)
FOLATE SERPL-MCNC: 15.6 NG/ML
GFR SERPL CREATININE-BSD FRML MDRD: 34 ML/MIN/1.73M*2
GFR SERPL CREATININE-BSD FRML MDRD: 44 ML/MIN/1.73M*2
GLUCOSE BLD MANUAL STRIP-MCNC: 101 MG/DL (ref 74–99)
GLUCOSE BLD MANUAL STRIP-MCNC: 107 MG/DL (ref 74–99)
GLUCOSE BLD MANUAL STRIP-MCNC: 121 MG/DL (ref 74–99)
GLUCOSE BLD MANUAL STRIP-MCNC: 162 MG/DL (ref 74–99)
GLUCOSE BLD MANUAL STRIP-MCNC: 70 MG/DL (ref 74–99)
GLUCOSE BLD MANUAL STRIP-MCNC: 71 MG/DL (ref 74–99)
GLUCOSE BLD MANUAL STRIP-MCNC: 90 MG/DL (ref 74–99)
GLUCOSE BLD MANUAL STRIP-MCNC: 93 MG/DL (ref 74–99)
GLUCOSE SERPL-MCNC: 112 MG/DL (ref 74–99)
GLUCOSE SERPL-MCNC: 89 MG/DL (ref 74–99)
HCT VFR BLD AUTO: 31 % (ref 41–52)
HGB BLD-MCNC: 10 G/DL (ref 13.5–17.5)
HOLD SPECIMEN: NORMAL
IMM GRANULOCYTES # BLD AUTO: 0.03 X10*3/UL (ref 0–0.7)
IMM GRANULOCYTES NFR BLD AUTO: 0.5 % (ref 0–0.9)
INR PPP: 1.3 (ref 0.9–1.1)
LEFT VENTRICLE INTERNAL DIMENSION DIASTOLE: 4.1 (ref 3.5–6)
LEFT VENTRICULAR OUTFLOW TRACT DIAMETER: 2
LYMPHOCYTES # BLD AUTO: 2.38 X10*3/UL (ref 1.2–4.8)
LYMPHOCYTES NFR BLD AUTO: 42.1 %
MAGNESIUM SERPL-MCNC: 1.54 MG/DL (ref 1.6–2.4)
MCH RBC QN AUTO: 27.6 PG (ref 26–34)
MCHC RBC AUTO-ENTMCNC: 32.3 G/DL (ref 32–36)
MCV RBC AUTO: 86 FL (ref 80–100)
MONOCYTES # BLD AUTO: 0.4 X10*3/UL (ref 0.1–1)
MONOCYTES NFR BLD AUTO: 7.1 %
MRSA DNA SPEC QL NAA+PROBE: NOT DETECTED
NEUTROPHILS # BLD AUTO: 2.76 X10*3/UL (ref 1.2–7.7)
NEUTROPHILS NFR BLD AUTO: 48.8 %
NRBC BLD-RTO: 0 /100 WBCS (ref 0–0)
P AXIS: 85 DEGREES
P OFFSET: 197 MS
P ONSET: 145 MS
PHOSPHATE SERPL-MCNC: 2.4 MG/DL (ref 2.5–4.9)
PHOSPHATE SERPL-MCNC: 5.3 MG/DL (ref 2.5–4.9)
PLATELET # BLD AUTO: 203 X10*3/UL (ref 150–450)
POTASSIUM SERPL-SCNC: 3.8 MMOL/L (ref 3.5–5.3)
POTASSIUM SERPL-SCNC: 4.2 MMOL/L (ref 3.5–5.3)
POTASSIUM UR-SCNC: 51 MMOL/L
POTASSIUM/CREAT UR-RTO: 20 MMOL/G CREAT
PR INTERVAL: 158 MS
PROT SERPL-MCNC: 6 G/DL (ref 6.4–8.2)
PROTHROMBIN TIME: 14.6 SECONDS (ref 9.8–12.8)
Q ONSET: 224 MS
QRS COUNT: 16 BEATS
QRS DURATION: 124 MS
QT INTERVAL: 420 MS
QTC CALCULATION(BAZETT): 525 MS
QTC FREDERICIA: 487 MS
R AXIS: -68 DEGREES
RBC # BLD AUTO: 3.62 X10*6/UL (ref 4.5–5.9)
SODIUM SERPL-SCNC: 139 MMOL/L (ref 136–145)
SODIUM SERPL-SCNC: 139 MMOL/L (ref 136–145)
SODIUM UR-SCNC: 25 MMOL/L
SODIUM/CREAT UR-RTO: 10 MMOL/G CREAT
T AXIS: 54 DEGREES
T OFFSET: 434 MS
TSH SERPL-ACNC: 1.78 MIU/L (ref 0.44–3.98)
VANCOMYCIN SERPL-MCNC: 10.8 UG/ML (ref 5–20)
VENTRICULAR RATE: 94 BPM
VIT B12 SERPL-MCNC: 842 PG/ML (ref 211–911)
WBC # BLD AUTO: 5.7 X10*3/UL (ref 4.4–11.3)

## 2023-11-09 PROCEDURE — 36415 COLL VENOUS BLD VENIPUNCTURE: CPT | Performed by: NURSE PRACTITIONER

## 2023-11-09 PROCEDURE — 80202 ASSAY OF VANCOMYCIN: CPT | Performed by: EMERGENCY MEDICINE

## 2023-11-09 PROCEDURE — 82947 ASSAY GLUCOSE BLOOD QUANT: CPT

## 2023-11-09 PROCEDURE — 84100 ASSAY OF PHOSPHORUS: CPT

## 2023-11-09 PROCEDURE — 82436 ASSAY OF URINE CHLORIDE: CPT | Performed by: NURSE PRACTITIONER

## 2023-11-09 PROCEDURE — 2500000005 HC RX 250 GENERAL PHARMACY W/O HCPCS: Performed by: NURSE PRACTITIONER

## 2023-11-09 PROCEDURE — 85025 COMPLETE CBC W/AUTO DIFF WBC: CPT

## 2023-11-09 PROCEDURE — 83921 ORGANIC ACID SINGLE QUANT: CPT

## 2023-11-09 PROCEDURE — 2500000004 HC RX 250 GENERAL PHARMACY W/ HCPCS (ALT 636 FOR OP/ED)

## 2023-11-09 PROCEDURE — 93306 TTE W/DOPPLER COMPLETE: CPT | Mod: 52

## 2023-11-09 PROCEDURE — 94003 VENT MGMT INPAT SUBQ DAY: CPT

## 2023-11-09 PROCEDURE — 97161 PT EVAL LOW COMPLEX 20 MIN: CPT | Mod: GP

## 2023-11-09 PROCEDURE — 82248 BILIRUBIN DIRECT: CPT

## 2023-11-09 PROCEDURE — 93005 ELECTROCARDIOGRAM TRACING: CPT

## 2023-11-09 PROCEDURE — 95715 VEEG EA 12-26HR INTMT MNTR: CPT

## 2023-11-09 PROCEDURE — 87640 STAPH A DNA AMP PROBE: CPT

## 2023-11-09 PROCEDURE — 83735 ASSAY OF MAGNESIUM: CPT

## 2023-11-09 PROCEDURE — 97165 OT EVAL LOW COMPLEX 30 MIN: CPT | Mod: GO

## 2023-11-09 PROCEDURE — 99223 1ST HOSP IP/OBS HIGH 75: CPT | Performed by: CLINICAL NURSE SPECIALIST

## 2023-11-09 PROCEDURE — 2500000001 HC RX 250 WO HCPCS SELF ADMINISTERED DRUGS (ALT 637 FOR MEDICARE OP): Performed by: NURSE PRACTITIONER

## 2023-11-09 PROCEDURE — 93010 ELECTROCARDIOGRAM REPORT: CPT | Performed by: INTERNAL MEDICINE

## 2023-11-09 PROCEDURE — 80069 RENAL FUNCTION PANEL: CPT | Performed by: NURSE PRACTITIONER

## 2023-11-09 PROCEDURE — 93306 TTE W/DOPPLER COMPLETE: CPT | Mod: REDUCED SERVICES | Performed by: STUDENT IN AN ORGANIZED HEALTH CARE EDUCATION/TRAINING PROGRAM

## 2023-11-09 PROCEDURE — 82140 ASSAY OF AMMONIA: CPT | Performed by: NURSE PRACTITIONER

## 2023-11-09 PROCEDURE — 2500000001 HC RX 250 WO HCPCS SELF ADMINISTERED DRUGS (ALT 637 FOR MEDICARE OP): Performed by: STUDENT IN AN ORGANIZED HEALTH CARE EDUCATION/TRAINING PROGRAM

## 2023-11-09 PROCEDURE — 80053 COMPREHEN METABOLIC PANEL: CPT

## 2023-11-09 PROCEDURE — 95720 EEG PHY/QHP EA INCR W/VEEG: CPT | Performed by: STUDENT IN AN ORGANIZED HEALTH CARE EDUCATION/TRAINING PROGRAM

## 2023-11-09 PROCEDURE — C9113 INJ PANTOPRAZOLE SODIUM, VIA: HCPCS | Performed by: STUDENT IN AN ORGANIZED HEALTH CARE EDUCATION/TRAINING PROGRAM

## 2023-11-09 PROCEDURE — 99291 CRITICAL CARE FIRST HOUR: CPT | Performed by: NURSE PRACTITIONER

## 2023-11-09 PROCEDURE — 2500000004 HC RX 250 GENERAL PHARMACY W/ HCPCS (ALT 636 FOR OP/ED): Performed by: EMERGENCY MEDICINE

## 2023-11-09 PROCEDURE — 2500000004 HC RX 250 GENERAL PHARMACY W/ HCPCS (ALT 636 FOR OP/ED): Performed by: STUDENT IN AN ORGANIZED HEALTH CARE EDUCATION/TRAINING PROGRAM

## 2023-11-09 PROCEDURE — 82570 ASSAY OF URINE CREATININE: CPT | Performed by: NURSE PRACTITIONER

## 2023-11-09 PROCEDURE — 96372 THER/PROPH/DIAG INJ SC/IM: CPT

## 2023-11-09 PROCEDURE — 1210000001 HC SEMI-PRIVATE ROOM DAILY

## 2023-11-09 PROCEDURE — 2500000004 HC RX 250 GENERAL PHARMACY W/ HCPCS (ALT 636 FOR OP/ED): Performed by: NURSE PRACTITIONER

## 2023-11-09 PROCEDURE — 96372 THER/PROPH/DIAG INJ SC/IM: CPT | Performed by: NURSE PRACTITIONER

## 2023-11-09 PROCEDURE — 95700 EEG CONT REC W/VID EEG TECH: CPT

## 2023-11-09 PROCEDURE — 85610 PROTHROMBIN TIME: CPT

## 2023-11-09 PROCEDURE — 36415 COLL VENOUS BLD VENIPUNCTURE: CPT

## 2023-11-09 PROCEDURE — 85730 THROMBOPLASTIN TIME PARTIAL: CPT

## 2023-11-09 RX ORDER — MAGNESIUM SULFATE HEPTAHYDRATE 40 MG/ML
2 INJECTION, SOLUTION INTRAVENOUS ONCE
Status: COMPLETED | OUTPATIENT
Start: 2023-11-09 | End: 2023-11-09

## 2023-11-09 RX ORDER — PHENYLEPHRINE HYDROCHLORIDE 25 MG/ML
1 SOLUTION/ DROPS OPHTHALMIC
Status: ACTIVE | OUTPATIENT
Start: 2023-11-09 | End: 2023-11-09

## 2023-11-09 RX ORDER — TAMSULOSIN HYDROCHLORIDE 0.4 MG/1
0.4 CAPSULE ORAL NIGHTLY
Status: DISCONTINUED | OUTPATIENT
Start: 2023-11-09 | End: 2023-11-14 | Stop reason: HOSPADM

## 2023-11-09 RX ORDER — AMOXICILLIN 250 MG
1 CAPSULE ORAL 2 TIMES DAILY
Status: DISCONTINUED | OUTPATIENT
Start: 2023-11-09 | End: 2023-11-14 | Stop reason: HOSPADM

## 2023-11-09 RX ORDER — DICLOFENAC SODIUM 1 MG/ML
1 SOLUTION/ DROPS OPHTHALMIC
Status: ACTIVE | OUTPATIENT
Start: 2023-11-09 | End: 2023-11-09

## 2023-11-09 RX ORDER — MOXIFLOXACIN 5 MG/ML
1 SOLUTION/ DROPS OPHTHALMIC
Status: ACTIVE | OUTPATIENT
Start: 2023-11-09 | End: 2023-11-09

## 2023-11-09 RX ORDER — TALC
3 POWDER (GRAM) TOPICAL NIGHTLY
Status: DISCONTINUED | OUTPATIENT
Start: 2023-11-09 | End: 2023-11-10

## 2023-11-09 RX ORDER — ESOMEPRAZOLE MAGNESIUM 40 MG/1
40 GRANULE, DELAYED RELEASE ORAL
Status: DISCONTINUED | OUTPATIENT
Start: 2023-11-10 | End: 2023-11-13

## 2023-11-09 RX ORDER — TROPICAMIDE 10 MG/ML
1 SOLUTION/ DROPS OPHTHALMIC
Status: DISPENSED | OUTPATIENT
Start: 2023-11-09 | End: 2023-11-09

## 2023-11-09 RX ORDER — VANCOMYCIN HYDROCHLORIDE 1 G/200ML
1000 INJECTION, SOLUTION INTRAVENOUS ONCE
Status: COMPLETED | OUTPATIENT
Start: 2023-11-09 | End: 2023-11-09

## 2023-11-09 RX ORDER — ACETAMINOPHEN 325 MG/1
650 TABLET ORAL EVERY 6 HOURS PRN
Status: DISCONTINUED | OUTPATIENT
Start: 2023-11-09 | End: 2023-11-13

## 2023-11-09 RX ORDER — CYCLOPENTOLATE HYDROCHLORIDE 10 MG/ML
1 SOLUTION/ DROPS OPHTHALMIC
Status: ACTIVE | OUTPATIENT
Start: 2023-11-09 | End: 2023-11-09

## 2023-11-09 RX ADMIN — ATORVASTATIN CALCIUM 10 MG: 20 TABLET, FILM COATED ORAL at 20:05

## 2023-11-09 RX ADMIN — VANCOMYCIN HYDROCHLORIDE 1000 MG: 1 INJECTION, SOLUTION INTRAVENOUS at 15:02

## 2023-11-09 RX ADMIN — PANTOPRAZOLE SODIUM 40 MG: 40 INJECTION, POWDER, FOR SOLUTION INTRAVENOUS at 08:13

## 2023-11-09 RX ADMIN — ACETAMINOPHEN 650 MG: 325 TABLET ORAL at 21:52

## 2023-11-09 RX ADMIN — HEPARIN SODIUM 5000 UNITS: 5000 INJECTION INTRAVENOUS; SUBCUTANEOUS at 23:32

## 2023-11-09 RX ADMIN — HEPARIN SODIUM 5000 UNITS: 5000 INJECTION INTRAVENOUS; SUBCUTANEOUS at 15:58

## 2023-11-09 RX ADMIN — VALPROIC ACID 250 MG: 250 SOLUTION ORAL at 20:06

## 2023-11-09 RX ADMIN — HEPARIN SODIUM 5000 UNITS: 5000 INJECTION INTRAVENOUS; SUBCUTANEOUS at 08:05

## 2023-11-09 RX ADMIN — HEPARIN SODIUM 5000 UNITS: 5000 INJECTION INTRAVENOUS; SUBCUTANEOUS at 00:31

## 2023-11-09 RX ADMIN — POTASSIUM PHOSPHATE, MONOBASIC POTASSIUM PHOSPHATE, DIBASIC 15 MMOL: 224; 236 INJECTION, SOLUTION, CONCENTRATE INTRAVENOUS at 09:47

## 2023-11-09 RX ADMIN — SODIUM CHLORIDE, POTASSIUM CHLORIDE, SODIUM LACTATE AND CALCIUM CHLORIDE 500 ML: 600; 310; 30; 20 INJECTION, SOLUTION INTRAVENOUS at 08:13

## 2023-11-09 RX ADMIN — MAGNESIUM SULFATE HEPTAHYDRATE 2 G: 40 INJECTION, SOLUTION INTRAVENOUS at 08:06

## 2023-11-09 RX ADMIN — PROPOFOL 10 MCG/KG/MIN: 10 INJECTION, EMULSION INTRAVENOUS at 01:24

## 2023-11-09 RX ADMIN — VALPROIC ACID 250 MG: 250 SOLUTION ORAL at 08:37

## 2023-11-09 RX ADMIN — Medication 3 MG: at 20:05

## 2023-11-09 ASSESSMENT — ACTIVITIES OF DAILY LIVING (ADL): BATHING_ASSISTANCE: MAXIMAL

## 2023-11-09 ASSESSMENT — COGNITIVE AND FUNCTIONAL STATUS - GENERAL
DAILY ACTIVITIY SCORE: 13
TURNING FROM BACK TO SIDE WHILE IN FLAT BAD: A LITTLE
HELP NEEDED FOR BATHING: A LOT
MOVING FROM LYING ON BACK TO SITTING ON SIDE OF FLAT BED WITH BEDRAILS: A LITTLE
PERSONAL GROOMING: A LITTLE
DRESSING REGULAR LOWER BODY CLOTHING: TOTAL
WALKING IN HOSPITAL ROOM: A LOT
DAILY ACTIVITIY SCORE: 13
MOVING TO AND FROM BED TO CHAIR: A LOT
STANDING UP FROM CHAIR USING ARMS: A LITTLE
CLIMB 3 TO 5 STEPS WITH RAILING: TOTAL
MOBILITY SCORE: 14
MOVING TO AND FROM BED TO CHAIR: A LOT
EATING MEALS: A LITTLE
DRESSING REGULAR LOWER BODY CLOTHING: TOTAL
TOILETING: A LOT
EATING MEALS: A LITTLE
CLIMB 3 TO 5 STEPS WITH RAILING: TOTAL
DRESSING REGULAR UPPER BODY CLOTHING: A LOT
DRESSING REGULAR UPPER BODY CLOTHING: A LOT
PERSONAL GROOMING: A LITTLE
STANDING UP FROM CHAIR USING ARMS: A LITTLE
MOVING FROM LYING ON BACK TO SITTING ON SIDE OF FLAT BED WITH BEDRAILS: A LITTLE
MOBILITY SCORE: 14
TOILETING: A LOT
HELP NEEDED FOR BATHING: A LOT
WALKING IN HOSPITAL ROOM: A LOT
TURNING FROM BACK TO SIDE WHILE IN FLAT BAD: A LITTLE

## 2023-11-09 ASSESSMENT — PAIN - FUNCTIONAL ASSESSMENT
PAIN_FUNCTIONAL_ASSESSMENT: 0-10
PAIN_FUNCTIONAL_ASSESSMENT: CPOT (CRITICAL CARE PAIN OBSERVATION TOOL)
PAIN_FUNCTIONAL_ASSESSMENT: 0-10

## 2023-11-09 ASSESSMENT — PAIN SCALES - GENERAL
PAINLEVEL_OUTOF10: 0 - NO PAIN
PAINLEVEL_OUTOF10: 10 - WORST POSSIBLE PAIN

## 2023-11-09 ASSESSMENT — PAIN DESCRIPTION - LOCATION: LOCATION: GENERALIZED

## 2023-11-09 NOTE — CARE PLAN
The patient's goals for the shift include patient arousable and following commands    The clinical goals for the shift include stabilize vitals and SBT    The pt made progress to many of the goals listed below. RN will continue reinforcing each point.      Problem: Fall/Injury  Goal: Not fall by end of shift  11/9/2023 1058 by Marybeth Felix RN  Outcome: Progressing  11/9/2023 1058 by Marybeth Felix RN  Outcome: Progressing  Goal: Be free from injury by end of the shift  Outcome: Progressing     Problem: Knowledge Deficit  Goal: Patient/family/caregiver demonstrates understanding of disease process, treatment plan, medications, and discharge instructions  Outcome: Progressing     Problem: Mechanical Ventilation  Goal: Patient Will Maintain Patent Airway  Outcome: Progressing     Problem: Safety - Medical Restraint  Goal: Remains free of injury from restraints (Restraint for Interference with Medical Device)  Outcome: Progressing  Goal: Free from restraint(s) (Restraint for Interference with Medical Device)  Outcome: Progressing

## 2023-11-09 NOTE — PROGRESS NOTES
SOCIAL WORK NOTE  Patient is currently intubated. Per notes, patient resides at [facility]. He has lives there ~11 years in behavioral unit. VM left for guardian [phone], office [phone]. Social work to follow.  BERENICE Ochoa, LISW-S ([id number])

## 2023-11-09 NOTE — PROGRESS NOTES
Occupational Therapy    Evaluation    Patient Name: Espinoza Ross  MRN: [id number]  Today's Date: 11/9/2023  Time Calculation  Start Time: 1450  Stop Time: 1510  Time Calculation (min): 20 min    Assessment  IP OT Assessment  OT Assessment: impaired ADLs/transfers  Prognosis: Fair  End of Session Communication: Bedside nurse  End of Session Patient Position: Bed, 3 rail up, Alarm on  Plan:  Treatment Interventions: ADL retraining, Functional transfer training, UE strengthening/ROM, Endurance training, Cognitive reorientation, Patient/family training, Neuromuscular reeducation  OT Frequency: 3 times per week  OT Discharge Recommendations: Moderate intensity level of continued care  OT Recommended Transfer Status: Minimal assist, Assist of 2  OT - OK to Discharge: Yes    Subjective     General:  General  Reason for Referral: poor mental status, unresponsive, intubation for airway protection; now extubated.  Past Medical History Relevant to Rehab: paranoid schizophrenia, polysubstance use, GERD, dementia; per RN, tartitive dyskinesia  Family/Caregiver Present: No  Co-Treatment: PT  Co-Treatment Reason: d/t patient having hx paranoid schizophrenia; per RN, patient agitated easilty; RN unable to remain in room for entirety of the session  Prior to Session Communication: Bedside nurse  Patient Position Received: Bed, 3 rail up, Alarm on  General Comment: initially reluctant to mobilize however, with encouragement and therapists initiating mobility, patient became somewhat agreeable.  Precautions:  Hearing/Visual Limitations: hearing appears WFL  Medical Precautions: Oxygen therapy device and L/min, Fall precautions  Vital Signs:  Heart Rate:  (pre 74, post 80)  Resp:  (pre 16, post 15)  SpO2:  (pre 100%, post 100%)  BP:  (pre 152/74, post 163/81)  Pain:  Pain Assessment  Pain Assessment: 0-10  Pain Score:  (patient c/o (B) foot pain, did not rate, RN aware)    Objective   Cognition:  Overall Cognitive Status:  "Impaired  Orientation Level:  (oriented to self, not oriented to place, states \"December\" and \"2024\" for time; re-orientation provided; CAM-ICU (+))  Following Commands:  (follows ~75% of one step commands with cues and repetition)  Safety Judgment: Decreased awareness of need for safety precautions     Home Living:  Type of Home:  (from [facility])   Prior Function:  Level of [place]:  (per SW note: patient ambulates with walker at facility, patient states \"I don't walk\")  Receives Help From:  (anticipate patient requires assistance from staff)  ADL Assistance:  (patient did not state if he completes ADLs independently or needs assistance; anticipate patient requires assistance)  Hand Dominance:  (patient did not respond)  IADL History:     ADL:  Eating Assistance: Stand by  Eating Deficit: Setup, Supervision/safety (anticipated)  Grooming Assistance: Minimal  Grooming Deficit:  (anticipated)  Bathing Assistance: Maximal  Bathing Deficit:  (anticipated)  UE Dressing Assistance: Moderate  UE Dressing Deficit:  (for donning gown)  LE Dressing Assistance: Total  LE Dressing Deficit: Don/doff R sock, Don/doff L sock  Toileting Assistance with Device: Moderate  Toileting Deficit:  (anticipated)  Activity Tolerance:     Bed Mobility/Transfers: Bed Mobility  Bed Mobility: Yes  Bed Mobility 1  Bed Mobility 1: Supine to sitting  Level of Assistance 1: Moderate assistance, Minimal verbal cues, Minimal tactile cues  Bed Mobility Comments 1: x1, HOB elevated  Bed Mobility 2  Bed Mobility  2: Sitting to supine  Level of Assistance 2: Minimum assistance, Minimal verbal cues  Bed Mobility Comments 2: x1 assist    Transfers  Transfer: Yes  Transfer 1  Transfer From 1: Sit to, Stand to  Transfer to 1: Sit, Stand  Technique 1: Sit to stand, Stand to sit  Transfer Level of Assistance 1: Arm in arm assistance, Minimum assistance, Minimal verbal cues, Minimal tactile cues, +2     Sitting Balance:  Static " Sitting Balance  Static Sitting-Level of Assistance:  (SBA)  Dynamic Sitting Balance  Dynamic Sitting-Comments: CGA  Standing Balance:  Static Standing Balance  Static Standing-Level of Assistance: Minimum assistance  Dynamic Standing Balance  Dynamic Standing-Comments: min A x2     Vision: Vision - Basic Assessment  Current Vision:  (patient reports having cataracts, did not answer if he does/does not use corrective lenses)     Strength:  Strength Comments: (B)  3+/5, (B) shoulders/elbows >/= 3-/5      Hand Function:  Hand Function  Gross Grasp: Functional  Coordination: Functional  Extremities: RUE   RUE :  ((R) shoulder AAROM grossly WFL, (R) distal joints grossly WFL) and LUE   LUE:  ((L) shoulder AAROM grossly WFL, (L) distal joints grossly WFL)    Outcome Measures: [facility] Daily Activity  Putting on and taking off regular lower body clothing: Total  Bathing (including washing, rinsing, drying): A lot  Putting on and taking off regular upper body clothing: A lot  Toileting, which includes using toilet, bedpan or urinal: A lot  Taking care of personal grooming such as brushing teeth: A little  Eating Meals: A little  Daily Activity - Total Score: 13    ,  , Confusion Assessment Method-ICU (CAM-ICU)  Feature 1: Acute Onset or Fluctuating Course: Positive  Feature 2: Inattention: Positive  Feature 4: Disorganized Thinking: Positive  Overall CAM-ICU: Positive  ,      ,  , and E = Exercise and Early Mobility  Current Activity: Standing  Education Documentation  ADL Training, taught by Cece Casper OT at 11/9/2023  3:58 PM.  Learner: Patient  Readiness: Acceptance  Method: Explanation  Response: Needs Reinforcement    Education Comments  No comments found.      Goals:   Encounter Problems       Encounter Problems (Active)       ADLs       Patient with complete upper body dressing with stand by assist level of assistance donning and doffing all UE clothes with no adaptive equipment. (Progressing)       Start:   11/09/23    Expected End:  11/30/23            Patient with complete lower body dressing with minimal assist  level of assistance donning and doffing all LE clothes  with PRN adaptive equipment. (Progressing)       Start:  11/09/23    Expected End:  11/30/23            Patient will feed self with independent level of assistance using PRN adaptive equipment. (Progressing)       Start:  11/09/23    Expected End:  11/30/23            Patient will complete daily grooming tasks with independent level of assistance and PRN adaptive equipment. (Progressing)       Start:  11/09/23    Expected End:  11/30/23            Patient will complete toileting including hygiene clothing management/hygiene with contact guard assist level of assistance. (Progressing)       Start:  11/09/23    Expected End:  11/30/23               BALANCE       Pt will maintain dynamic standing balance during ADL task with contact guard assist level of assistance in order to demonstrate decreased risk of falling and improved postural control. (Progressing)       Start:  11/09/23    Expected End:  11/30/23               COGNITION/SAFETY       Patient will follow 100% Simple commands to allow improved ADL performance. (Progressing)       Start:  11/09/23    Expected End:  11/30/23               EXERCISE/STRENGTHENING       Patient with increase BUE to WFL strength. (Progressing)       Start:  11/09/23    Expected End:  11/30/23               MOBILITY       Patient will perform Functional mobility Household distances with contact guard assist level of assistance and least restrictive device in order to improve safety and functional mobility. (Progressing)       Start:  11/09/23    Expected End:  11/30/23               TRANSFERS       Patient will perform bed mobility stand by assist level of assistance in order to improve safety and independence with mobility (Progressing)       Start:  11/09/23    Expected End:  11/30/23            Patient will complete  functional transfers with least restrictive device with contact guard assist level of assistance. (Progressing)       Start:  11/09/23    Expected End:  11/30/23                    Cece Casper OTR/L
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
## 2023-11-09 NOTE — PROGRESS NOTES
"Vancomycin Dosing by Pharmacy- FOLLOW UP    Espinoza Ross is a 67 y.o. year old male who Pharmacy has been consulted for vancomycin dosing for pneumonia. Based on the patient's indication and renal status this patient is being dosed based on a goal trough/random level of 15-20.     Renal function is currently declining.    Current vancomycin dose: 2000 mg given     Most recent random level: 10.8 mcg/mL    Visit Vitals  /81   Pulse 77   Temp 38.1 °C (100.6 °F)   Resp 16        Lab Results   Component Value Date    CREATININE 2.11 (H) 11/09/2023    CREATININE 2.23 (H) 11/08/2023    CREATININE 0.85 10/18/2023    CREATININE 0.80 08/02/2023    CREATININE 0.73 01/24/2022    CREATININE 0.59 01/22/2022    CREATININE 0.68 01/21/2022        Patient weight is No results found for: \"PTWEIGHT\"    No results found for: \"CULTURE\"     I/O last 3 completed shifts:  In: 1330.1 (19.1 mL/kg) [I.V.:250.1 (3.6 mL/kg); NG/GT:80; IV Piggyback:1000]  Out: 270 (3.9 mL/kg) [Urine:270 (0.1 mL/kg/hr)]  Dosing Weight: 69.5 kg   [unfilled]    Lab Results   Component Value Date    PATIENTTEMP 37.0 11/08/2023    PATIENTTEMP 37.0 11/08/2023    PATIENTTEMP 37.0 10/18/2023        Assessment/Plan    Below goal random/trough level. Will give 1000 mg x 1.    The next level will be obtained on 11/10 at 1st am draw. May be obtained sooner if clinically indicated.   Will continue to monitor renal function daily while on vancomycin and order serum creatinine at least every 48 hours if not already ordered.  Follow for continued vancomycin needs, clinical response, and signs/symptoms of toxicity.       Corin Wilcox, PharmD           "
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
## 2023-11-09 NOTE — SIGNIFICANT EVENT
Restraints indicated to maintain lines/tubes.  Alternative therapies have been attempted and have been ineffective.  Restrain with soft wrist restraints and side rails up x4 until medical devices discontinued and/or patient able to participate with plan of care.
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
## 2023-11-09 NOTE — CARE PLAN
Problem: Safety - Medical Restraint  Goal: Remains free of injury from restraints (Restraint for Interference with Medical Device)  Outcome: Progressing

## 2023-11-09 NOTE — ED PROCEDURE NOTE
Procedure  Critical Care    Performed by: Ok Andrews MD MPH  Authorized by: Ok Andrews MD MPH    Critical care provider statement:     Critical care time (minutes):  45    Critical care was necessary to treat or prevent imminent or life-threatening deterioration of the following conditions:  Respiratory failure and CNS failure or compromise    Critical care was time spent personally by me on the following activities:  Blood draw for specimens, discussions with consultants, examination of patient, interpretation of cardiac output measurements, obtaining history from patient or surrogate, ordering and performing treatments and interventions, ordering and review of laboratory studies, ordering and review of radiographic studies, pulse oximetry, re-evaluation of patient's condition and review of old charts    I assumed direction of critical care for this patient from another provider in my specialty: no      Care discussed with: admitting provider                 Ok Andrews MD MPH  11/09/23 [id number]

## 2023-11-09 NOTE — PROGRESS NOTES
Physical Therapy                 Therapy Communication Note    Patient Name: Espinoza Ross  MRN: [id number]  Today's Date: 11/9/2023     Discipline: Physical Therapy    Missed Visit Reason: Missed Visit Reason:  (per RN, patient becomes easily agitated, remains intubated. PT will hold and re-attempt as time and schedule allows and as medically appropriate.)    Missed Time: Attempt    Comment:

## 2023-11-09 NOTE — PROGRESS NOTES
Physical Therapy    Physical Therapy Evaluation    Patient Name: Espinoza Ross  MRN: [id number]  Today's Date: 11/9/2023   Time Calculation  Start Time: 1448  Stop Time: 1510  Time Calculation (min): 22 min    Assessment/Plan   PT Assessment  PT Assessment Results: Decreased strength, Impaired balance, Decreased mobility, Decreased cognition, Decreased safety awareness, Impaired judgement  Rehab Prognosis: Good  End of Session Communication: Bedside nurse  End of Session Patient Position: Bed, 3 rail up, Alarm on  IP OR SWING BED PT PLAN  Inpatient or Swing Bed: Inpatient  PT Plan  Treatment/Interventions: Bed mobility, Transfer training, Gait training, Balance training, Strengthening, Therapeutic exercise, Therapeutic activity, Postural re-education  PT Plan: Skilled PT  PT Frequency: 3 times per week  PT Discharge Recommendations: Moderate intensity level of continued care  PT - OK to Discharge: Yes      Subjective   General Visit Information:  General  Reason for Referral: poor mental status, unresponsive, intubation for airway protection; now extubated.  Past Medical History Relevant to Rehab: paranoid schizophrenia, polysubstance use, GERD, dementia; per RN, tartitive dyskinesia  Missed Visit: Yes  Missed Visit Reason:  (per RN, patient becomes easily agitated, remains intubated. PT will hold and re-attempt as time and schedule allows and as medically appropriate.)  Family/Caregiver Present: No  Co-Treatment: OT  Co-Treatment Reason: d/t patient having hx paranoid schizophrenia; per RN, patient agitated easilty; RN unable to remain in room for entirety of the session  Prior to Session Communication: Bedside nurse  Patient Position Received: Bed, 3 rail up, Alarm on  General Comment: initially reluctant to mobilize however, with encouragement and therapists initiating mobility, patient became somewhat agreeable.  Home Living:  Home Living  Type of Home:  (from [facility])  Prior Level of  Function:  Prior Function Per Pt/Caregiver Report  Level of [place]:  (per SW note: ambulates with walker at facility however, patient did not elaborate on this)  Receives Help From:  (anticipate patient requires assist from staff from [facility])  Precautions:  Precautions  Hearing/Visual Limitations: patient appears to have impaired vision  Medical Precautions: Oxygen therapy device and L/min, Fall precautions  Vital Signs:  Vital Signs  Heart Rate:  (pre: 71 post: 80)  Resp:  (pre: 10 post: 15)  SpO2:  (pre: 100% post: 100% on 4L)  BP:  (pre: 152/74 post: 163/81)    Objective   Pain:  Pain Assessment  Pain Assessment: 0-10  Pain Score:  (did not rate numerically; however, mentioned having B foot pain)  Cognition:  Cognition  Overall Cognitive Status: Impaired  Orientation Level:  (oriented to self; stated 2024, unclear if patient knew he was in the hospital; re-orientation provided.)  Following Commands:  (~75% with cueing and repetition, single step)  Safety Judgment:  (decreased)  Attention: Exceptions to WFL    General Assessments:            Static Sitting Balance  Static Sitting-Balance Support: Bilateral upper extremity supported  Static Sitting-Level of Assistance:  (SBA-CGx1)  Dynamic Sitting Balance  Dynamic Sitting-Balance Support: Bilateral upper extremity supported  Dynamic Sitting-Comments: CGx1    Static Standing Balance  Static Standing-Balance Support: Bilateral upper extremity supported  Static Standing-Level of Assistance: Minimum assistance  Static Standing-Comment/Number of Minutes: x2 assist  Dynamic Standing Balance  Dynamic Standing-Balance Support: Bilateral upper extremity supported  Dynamic Standing-Comments: MINx2 with B arm in arm assist  Functional Assessments:  Bed Mobility  Bed Mobility: Yes  Bed Mobility 1  Bed Mobility 1: Supine to sitting  Level of Assistance 1: Moderate assistance, Minimal verbal cues, Minimal tactile cues  Bed Mobility Comments 1: x1 with HOB elevated, draw  sheet, increase encouragement required to complete task  Bed Mobility 2  Bed Mobility  2: Sitting to supine  Level of Assistance 2: Minimum assistance, Minimal verbal cues  Bed Mobility Comments 2: x1 assist    Transfers  Transfer: Yes  Transfer 1  Transfer From 1: Sit to, Stand to  Transfer to 1: Sit, Stand  Technique 1: Sit to stand, Stand to sit  Transfer Level of Assistance 1: Arm in arm assistance, Minimum assistance, Minimal verbal cues, Minimal tactile cues, +2  Trials/Comments 1: cueing for task sequencing, initiation.    Ambulation/Gait Training  Ambulation/Gait Training Performed: No  Extremity/Trunk Assessments:  RUE   RUE :  (AROM grossly WFL)  LUE   LUE:  (AROM grossly WFL)  RLE   RLE :  (AROM grossly WFL)  LLE   LLE :  (AROM grossly WFL)  Outcome Measures:  [facility] Basic Mobility  Turning from your back to your side while in a flat bed without using bedrails: A little  Moving from lying on your back to sitting on the side of a flat bed without using bedrails: A little  Moving to and from bed to chair (including a wheelchair): A lot  Standing up from a chair using your arms (e.g. wheelchair or bedside chair): A little  To walk in hospital room: A lot  Climbing 3-5 steps with railing: Total  Basic Mobility - Total Score: 14    FSS-ICU  Ambulation: Unable to attempt due to weakness  Rolling: Minimal assistance (performs 75% or more of task)  Sitting: Minimal assistance (performs 75% or more of task)  Transfer Sit-to-Stand: Total assistance (performs 25% or requires another person)  Transfer Supine-to-Sit: Moderate assistance (performs 50 - 74% of task)  Total Score: 12    CAM-ICU (+)     E = Exercise and Early Mobility  Current Activity: Standing    Encounter Problems       Encounter Problems (Active)       Balance       Patient will complete static (SBAx1) and dynamic (Cgx1) standing balance activities using lRD as needed without acute LOB        Start:  11/09/23    Expected End:  11/30/23                Mobility       STG - Patient will ambulate >/=50 ft using LRD as needed without acute LOB, with Cgx1 assist.        Start:  11/09/23    Expected End:  11/30/23            patient will complete BLE there-ex in order to improve BLE strength and to assist with the completion of functional mobility tasks.        Start:  11/09/23    Expected End:  11/30/23               Transfers       STG - Transfer from bed to chair with CGx1 assist using LRD as needed without acute LOB        Start:  11/09/23    Expected End:  11/30/23            STG - Patient will perform bed mobility with SBAx1 without acute LOB        Start:  11/09/23    Expected End:  11/30/23            STG - Patient will transfer sit to and from stand with CGx1 assist using lRD as needed without acute LOB        Start:  11/09/23    Expected End:  11/30/23                   Education Documentation  Mobility Training, taught by Taylor Andres PT at 11/9/2023  3:36 PM.  Learner: Patient  Readiness: Nonacceptance  Method: Explanation  Response: Needs Reinforcement    Education Comments  No comments found.        Taylor Andres, REGINALD, DPT

## 2023-11-09 NOTE — PROGRESS NOTES
Critical Care Daily Progress      Subjective   Patient is a 67 y.o. male admitted on 11/8/2023  9:02 AM with the following indication(s) for ICU care with Alter mental status, required intubation.     Interval History: no acute events overnight.     Complaints: patient unable to participate due to intubated.   Review of Systems  Physical Exam    Scheduled Medications:   [Held by provider] ARIPiprazole, 30 mg, oral, Daily  [Held by provider] atorvastatin, 10 mg, oral, Nightly  [Held by provider] donepezil, 10 mg, oral, Nightly  [Held by provider] gabapentin, 200 mg, oral, BID  heparin (porcine), 5,000 Units, subcutaneous, q8h  insulin lispro, 0-5 Units, subcutaneous, q4h  lactated Ringer's, 500 mL, intravenous, Once  [Held by provider] OXcarbazepine, 300 mg, oral, BID  pantoprazole, 40 mg, intravenous, Daily  potassium phosphate, 15 mmol, intravenous, Once  [Held by provider] sertraline, 75 mg, oral, Daily  valproic acid, 250 mg, oral, q12h         Continuous Medications:         PRN Medications:   PRN medications: dextrose 10 % in water (D10W), dextrose, glucagon, oxygen    Objective   Vitals:  Most Recent:  Vitals:    11/09/23 0800   BP: (!) 141/104   Pulse: 61   Resp: 12   Temp: 37.9 °C (100.2 °F)   SpO2: 100%       24hr Min/Max:  Temp  Min: 35.6 °C (96.1 °F)  Max: 37.9 °C (100.2 °F)  Pulse  Min: 42  Max: 83  BP  Min: 85/65  Max: 190/103  Resp  Min: 12  Max: 56  SpO2  Min: 99 %  Max: 100 %    LDA:  ETT  8 mm (Active)   Placement Date/Time: 11/08/23 0919   Hand Hygiene Completed: Yes  Technique: Rapid sequence  ETT Type: ETT - single  Single Lumen Tube Size: 8 mm  Location: Oral  Placement Verification: Auscultation  Placed By: ED physician;Resident ;RT   Number of days: 1       Urethral Catheter 16 Fr. (Active)   Placement Date/Time: 11/08/23 0956   Placed by: [place]  Tube Size (Fr.): 16 Fr.  Urine Returned: Yes   Number of days: 0       NG/OG/Feeding Tube Center mouth (Active)   Placement Date/Time: 11/08/23 0955    Tube Location: Center mouth   Number of days: 0         Vent settings:  Vent Mode: Volume control/assist control  FiO2 (%):  [30 %-40 %] 30 %  S RR:  [12-18] 12  S VT:  [450 mL] 450 mL  PEEP/CPAP (cm H2O):  [5 cm H20] 5 cm H20  MAP (cm H2O):  [8-10] 9    Hemodynamic parameters for last 24 hours:       I/O:    Intake/Output Summary (Last 24 hours) at 11/9/2023 0926  Last data filed at 11/9/2023 0800  Gross per 24 hour   Intake 1390.12 ml   Output 310 ml   Net 1080.12 ml       Physical Exam:   Physical Exam: awake, not sedated, followed command, maex4, unable to communicate due to intubation   Eyes: R eye pupils +1, L eye pupil +4 sluggish   ENMT: mucous membranes moist, no apparent injury  Head/Neck: NC/AT  Respiratory/Thorax: clear upper lobes, diminished BB no wheezing or rales   Cardiovascular: RRR s1/s2 no s3/s4 no gmr no edema   Gastrointestinal: Nondistended, soft, non-tender, BS present x 4  : Palomino catheter in place draining clear yellow urine  Musculoskeletal: 2/2 BUE and BLE   Extremities: normal extremities, no edema  Neurological: followed command, awake, alert, unable to stay level of orientation   Skin: Warm and dry, no lesions, no rashes    Lab/Radiology/Diagnostic Review:  [unfilled]  Results for orders placed or performed during the hospital encounter of 11/08/23 (from the past 24 hour(s))   Magnesium   Result Value Ref Range    Magnesium 2.03 1.60 - 2.40 mg/dL   Phosphorus   Result Value Ref Range    Phosphorus 5.4 (H) 2.5 - 4.9 mg/dL   Troponin I, High Sensitivity   Result Value Ref Range    Troponin I, High Sensitivity 8 0 - 53 ng/L   B-Type Natriuretic Peptide   Result Value Ref Range    BNP 7 0 - 99 pg/mL   Lactate   Result Value Ref Range    Lactate 1.0 0.4 - 2.0 mmol/L   CBC and Auto Differential   Result Value Ref Range    WBC 8.3 4.4 - 11.3 x10*3/uL    nRBC 0.0 0.0 - 0.0 /100 WBCs    RBC 4.19 (L) 4.50 - 5.90 x10*6/uL    Hemoglobin 11.9 (L) 13.5 - 17.5 g/dL    Hematocrit 37.2 (L) 41.0 -  52.0 %    MCV 89 80 - 100 fL    MCH 28.4 26.0 - 34.0 pg    MCHC 32.0 32.0 - 36.0 g/dL    RDW 13.5 11.5 - 14.5 %    Platelets 258 150 - 450 x10*3/uL    Immature Granulocytes %, Automated 0.7 0.0 - 0.9 %    Immature Granulocytes Absolute, Automated 0.06 0.00 - 0.70 x10*3/uL   Comprehensive Metabolic Panel   Result Value Ref Range    Glucose 108 (H) 74 - 99 mg/dL    Sodium 138 136 - 145 mmol/L    Potassium 5.0 3.5 - 5.3 mmol/L    Chloride 103 98 - 107 mmol/L    Bicarbonate 25 21 - 32 mmol/L    Anion Gap 15 10 - 20 mmol/L    Urea Nitrogen 26 (H) 6 - 23 mg/dL    Creatinine 2.23 (H) 0.50 - 1.30 mg/dL    eGFR 32 (L) >60 mL/min/1.73m*2    Calcium 9.4 8.6 - 10.6 mg/dL    Albumin 3.7 3.4 - 5.0 g/dL    Alkaline Phosphatase 52 33 - 136 U/L    Total Protein 8.0 6.4 - 8.2 g/dL    AST 50 (H) 9 - 39 U/L    Bilirubin, Total 0.4 0.0 - 1.2 mg/dL    ALT 26 10 - 52 U/L   Blood Culture    Specimen: Peripheral Venipuncture; Blood culture   Result Value Ref Range    Blood Culture Loaded on Instrument - Culture in progress    Blood Culture    Specimen: Peripheral Venipuncture; Blood culture   Result Value Ref Range    Blood Culture Loaded on Instrument - Culture in progress    Light Blue Top   Result Value Ref Range    Extra Tube Hold for add-ons.    Lavender Top   Result Value Ref Range    Extra Tube Hold for add-ons.    Manual Differential   Result Value Ref Range    Neutrophils %, Manual 42.1 40.0 - 80.0 %    Lymphocytes %, Manual 51.7 13.0 - 44.0 %    Monocytes %, Manual 5.3 2.0 - 10.0 %    Eosinophils %, Manual 0.9 0.0 - 6.0 %    Basophils %, Manual 0.0 0.0 - 2.0 %    Seg Neutrophils Absolute, Manual 3.49 1.20 - 7.00 x10*3/uL    Lymphocytes Absolute, Manual 4.29 1.20 - 4.80 x10*3/uL    Monocytes Absolute, Manual 0.44 0.10 - 1.00 x10*3/uL    Eosinophils Absolute, Manual 0.07 0.00 - 0.70 x10*3/uL    Basophils Absolute, Manual 0.00 0.00 - 0.10 x10*3/uL    Total Cells Counted 114     RBC Morphology See Below     Bryant Cells Few      Acanthocytes Few    TSH with reflex to Free T4 if abnormal   Result Value Ref Range    Thyroid Stimulating Hormone 1.23 0.44 - 3.98 mIU/L   Urinalysis with Reflex Microscopic and Culture   Result Value Ref Range    Color, Urine Yellow Straw, Yellow    Appearance, Urine Hazy (N) Clear    Specific Gravity, Urine 1.025 1.005 - 1.035    pH, Urine 5.5 5.0, 5.5, 6.0, 6.5, 7.0, 7.5, 8.0    Protein, Urine 100 (2+) (A) NEGATIVE, TRACE mg/dL    Glucose, Urine NEGATIVE NEGATIVE mg/dL    Blood, Urine MODERATE (2+) (A) NEGATIVE    Ketones, Urine 15 (1+) (A) NEGATIVE mg/dL    Bilirubin, Urine SMALL (1+) (A) NEGATIVE    Urobilinogen, Urine 0.2 0.2, 1.0 mg/dL    Nitrite, Urine NEGATIVE NEGATIVE    Leukocyte Esterase, Urine NEGATIVE NEGATIVE   Extra Urine Gray Tube   Result Value Ref Range    Extra Tube Hold for add-ons.    Urinalysis Microscopic   Result Value Ref Range    WBC, Urine 21-50 (A) 1-5, NONE /HPF    RBC, Urine 11-20 (A) NONE, 1-2, 3-5 /HPF    Squamous Epithelial Cells, Urine 1-9 (SPARSE) Reference range not established. /HPF    Renal Epithelial Cells, Urine 3-5 (1+) Reference range not established. /HPF    Hyaline Casts, Urine 1+ (A) NONE /LPF    Amorphous Crystals, Urine 1+ NONE, 1+, 2+ /HPF   Blood Gas Venous Full Panel Unsolicited   Result Value Ref Range    POCT pH, Venous 7.38 7.33 - 7.43 pH    POCT pCO2, Venous 42 41 - 51 mm Hg    POCT pO2, Venous 25 (L) 35 - 45 mm Hg    POCT SO2, Venous 23 (L) 45 - 75 %    POCT Oxy Hemoglobin, Venous 22.7 (L) 45.0 - 75.0 %    POCT Hematocrit Calculated, Venous 39.0 (L) 41.0 - 52.0 %    POCT Sodium, Venous 136 136 - 145 mmol/L    POCT Potassium, Venous 4.3 3.5 - 5.3 mmol/L    POCT Chloride, Venous 105 98 - 107 mmol/L    POCT Ionized Calicum, Venous 1.09 (L) 1.10 - 1.33 mmol/L    POCT Glucose, Venous 154 (H) 74 - 99 mg/dL    POCT Lactate, Venous 1.2 0.4 - 2.0 mmol/L    POCT Base Excess, Venous -0.4 -2.0 - 3.0 mmol/L    POCT HCO3 Calculated, Venous 24.8 22.0 - 26.0 mmol/L    POCT  Hemoglobin, Venous 12.9 (L) 13.5 - 17.5 g/dL    POCT Anion Gap, Venous 11.0 10.0 - 25.0 mmol/L    Patient Temperature 37.0 degrees Celsius   Coagulation Screen   Result Value Ref Range    Protime 13.6 (H) 9.8 - 12.8 seconds    INR 1.2 (H) 0.9 - 1.1    aPTT 26 (L) 27 - 38 seconds   POCT GLUCOSE   Result Value Ref Range    POCT Glucose 104 (H) 74 - 99 mg/dL   Drug Screen, Urine   Result Value Ref Range    Amphetamine Screen, Urine Presumptive Negative Presumptive Negative    Barbiturate Screen, Urine Presumptive Negative Presumptive Negative    Benzodiazepines Screen, Urine Presumptive Negative Presumptive Negative    Cannabinoid Screen, Urine Presumptive Negative Presumptive Negative    Cocaine Metabolite Screen, Urine Presumptive Negative Presumptive Negative    Fentanyl Screen, Urine Presumptive Positive (A) Presumptive Negative    Opiate Screen, Urine Presumptive Negative Presumptive Negative    Oxycodone Screen, Urine Presumptive Negative Presumptive Negative    PCP Screen, Urine Presumptive Negative Presumptive Negative   Vitamin B12   Result Value Ref Range    Vitamin B12 842 211 - 911 pg/mL   Folate   Result Value Ref Range    Folate, Serum 15.6 >5.0 ng/mL   TSH   Result Value Ref Range    Thyroid Stimulating Hormone 1.78 0.44 - 3.98 mIU/L   POCT GLUCOSE   Result Value Ref Range    POCT Glucose 88 74 - 99 mg/dL   POCT GLUCOSE   Result Value Ref Range    POCT Glucose 71 (L) 74 - 99 mg/dL   ECG 12 lead   Result Value Ref Range    Ventricular Rate 94 BPM    Atrial Rate 94 BPM    MO Interval 158 ms    QRS Duration 124 ms    QT Interval 420 ms    QTC Calculation(Bazett) 525 ms    P Axis 85 degrees    R Axis -68 degrees    T Axis 54 degrees    QRS Count 16 beats    Q Onset 224 ms    P Onset 145 ms    P Offset 197 ms    T Offset 434 ms    QTC Fredericia 487 ms   POCT GLUCOSE   Result Value Ref Range    POCT Glucose 93 74 - 99 mg/dL   Hepatic Function Panel   Result Value Ref Range    Albumin 3.0 (L) 3.4 - 5.0 g/dL     Bilirubin, Total 1.7 (H) 0.0 - 1.2 mg/dL    Bilirubin, Direct 0.9 (H) 0.0 - 0.3 mg/dL    Alkaline Phosphatase 108 33 - 136 U/L    ALT 69 (H) 10 - 52 U/L     (H) 9 - 39 U/L    Total Protein 6.0 (L) 6.4 - 8.2 g/dL   Coagulation Screen   Result Value Ref Range    Protime 14.6 (H) 9.8 - 12.8 seconds    INR 1.3 (H) 0.9 - 1.1    aPTT 32 27 - 38 seconds   Magnesium   Result Value Ref Range    Magnesium 1.54 (L) 1.60 - 2.40 mg/dL   CBC and Auto Differential   Result Value Ref Range    WBC 5.7 4.4 - 11.3 x10*3/uL    nRBC 0.0 0.0 - 0.0 /100 WBCs    RBC 3.62 (L) 4.50 - 5.90 x10*6/uL    Hemoglobin 10.0 (L) 13.5 - 17.5 g/dL    Hematocrit 31.0 (L) 41.0 - 52.0 %    MCV 86 80 - 100 fL    MCH 27.6 26.0 - 34.0 pg    MCHC 32.3 32.0 - 36.0 g/dL    RDW 13.7 11.5 - 14.5 %    Platelets 203 150 - 450 x10*3/uL    Neutrophils % 48.8 40.0 - 80.0 %    Immature Granulocytes %, Automated 0.5 0.0 - 0.9 %    Lymphocytes % 42.1 13.0 - 44.0 %    Monocytes % 7.1 2.0 - 10.0 %    Eosinophils % 1.1 0.0 - 6.0 %    Basophils % 0.4 0.0 - 2.0 %    Neutrophils Absolute 2.76 1.20 - 7.70 x10*3/uL    Immature Granulocytes Absolute, Automated 0.03 0.00 - 0.70 x10*3/uL    Lymphocytes Absolute 2.38 1.20 - 4.80 x10*3/uL    Monocytes Absolute 0.40 0.10 - 1.00 x10*3/uL    Eosinophils Absolute 0.06 0.00 - 0.70 x10*3/uL    Basophils Absolute 0.02 0.00 - 0.10 x10*3/uL   Phosphorus   Result Value Ref Range    Phosphorus 2.4 (L) 2.5 - 4.9 mg/dL   Basic Metabolic Panel   Result Value Ref Range    Glucose 89 74 - 99 mg/dL    Sodium 139 136 - 145 mmol/L    Potassium 3.8 3.5 - 5.3 mmol/L    Chloride 106 98 - 107 mmol/L    Bicarbonate 22 21 - 32 mmol/L    Anion Gap 15 10 - 20 mmol/L    Urea Nitrogen 28 (H) 6 - 23 mg/dL    Creatinine 2.11 (H) 0.50 - 1.30 mg/dL    eGFR 34 (L) >60 mL/min/1.73m*2    Calcium 8.3 (L) 8.6 - 10.6 mg/dL   POCT GLUCOSE   Result Value Ref Range    POCT Glucose 90 74 - 99 mg/dL       This patient has a urinary catheter   Reason for the urinary  catheter remaining today? critically ill patient who need accurate urinary output measurements      Assessment/Plan   Principal Problem:    Unresponsive    67 year old male who is a resident at [facility], presented to the ED with poor mental status, unresponsive, intubated for airway protection.   Now s/p extubation on 11/09 at noon.     Neuro: hx of Paranoid schizophrenia. Polysubstance use disorder in the past. Dementia.  Presented with Acute encephalopathy: Ddx medication vs metabolic vs arrhythmia vs seizures   - Neurology consulted, VEEG done, acute metabolic encephalopathy, no seizures   - consult PSYCH to advice with resuming home meds   - Home med: Melatonin, aripiprazole 30 mg daily, divalproex sodium 250 mg BID, Donepezil HCL 10 mg daily, gabapentin 200 mb BID, trazodone 25 mg at HS, trileptal 300 mg BID, haldol 1 mg prn for agitation, Hydroxyzine hcl 50 mg prn for anxiety.   -PT/OT  - 11/08 CT head showed: no new acute findings.     Eye drops to L eye   - prednisolon acetate L eye TID   - Cyclogyl opth 1% L eye BID   - Polyvinyl eye drop Both eye     MSK: no acute pain.  - pt uses a walker per facility records   -cont acetaminophen prn     Resp: Presented to the ED with GCS<8, intubated for airway protection  Now extubated 11/09 at noon   - wean o2 per sat>92%     Cardiac: hx of HTN and HFpEF 55%. Presented with bradycardia with HR 40s (resolved), noted new RBBB, no ST changes, negative troponin, normal lactate   - order ECG showed QTC>512 ms , RBBB, L axix devation. No ST changes   - Holding home med (Atorvastatin 10 mg daily, Carvedilol 3.125 mg BD, furosemide 20 mg daily, lisinopril 40 mg daily  - will resume home meds per tolerance   -pending a new ECHO on this admission to evaluate possible reason of RBBB causes   11/18/2021 ECHO LVSF normal, EF 55%. Impaired relaxed pattern LVD filling.     ID: low suspicious for infection   - Abx: no abx needed   - Cultures: 11/08 UA clean, 11/08 Bld cx x2  preliminary negative     FEN/GI: hx of GERD, c/o dysphagia diet (Noted change in diet texture at the facility from regular to mechanical soft)   Last BM none  - consult SLP to evaluate swallow, no dentition   - Bowel Regimen:   - Daily RFP  - Diet NPO   - PPI  Home med (omprazole 20 mg daily, vitamin B6, vitamin D,     Renal: hx of BPH. Presented with non oliguric ANNETTE, likely prerenal causes (reported pt with poor oral intake prior days to this admission and pt taking furosemide at the facility)  - order urine electrolyte to evaluate ANNETTE causes   - Daily wt and Strict I&Os  - Daily RFP  - Holding home med (Tamsulonin 0.4 mg daily for BPH)   - order Ammonia level   - Holding home lactulose 10 mg TID (per records takes it for constipation)     Heme: Anemia of chronic disease   -Daily CBC  - Holding home Ferrous sulfate 325 mg BID     Endo: hx of IDDM II (HgbA1C 7.8% on 06/2023)   - cont ISS  - consider restarting long acting insulin   - Home med (lantus 32 units at HS and ISS)   - Hypoglycemia protocol     P: heparin subcutaneous, PPI   F prn to keep net even   E replete   N NPO   A: ETT/Candelario ONOFRE 11/08     CODE status FULL CODE   Yunior Kelly  Contact Number(s): [phone]  Contact Relationship: guardian    Dispo: not medically ready for discharge.      I spent 60 minutes with the patient. 30 minutes of which was providing critical care for the patient.

## 2023-11-10 LAB
ALBUMIN SERPL BCP-MCNC: 3.4 G/DL (ref 3.4–5)
ANION GAP SERPL CALC-SCNC: 14 MMOL/L (ref 10–20)
BUN SERPL-MCNC: 18 MG/DL (ref 6–23)
CALCIUM SERPL-MCNC: 8.7 MG/DL (ref 8.6–10.6)
CHLORIDE SERPL-SCNC: 104 MMOL/L (ref 98–107)
CO2 SERPL-SCNC: 25 MMOL/L (ref 21–32)
CREAT SERPL-MCNC: 1.04 MG/DL (ref 0.5–1.3)
ERYTHROCYTE [DISTWIDTH] IN BLOOD BY AUTOMATED COUNT: 13.6 % (ref 11.5–14.5)
GFR SERPL CREATININE-BSD FRML MDRD: 79 ML/MIN/1.73M*2
GLUCOSE BLD MANUAL STRIP-MCNC: 135 MG/DL (ref 74–99)
GLUCOSE BLD MANUAL STRIP-MCNC: 142 MG/DL (ref 74–99)
GLUCOSE BLD MANUAL STRIP-MCNC: 154 MG/DL (ref 74–99)
GLUCOSE BLD MANUAL STRIP-MCNC: 202 MG/DL (ref 74–99)
GLUCOSE SERPL-MCNC: 125 MG/DL (ref 74–99)
HCT VFR BLD AUTO: 32.8 % (ref 41–52)
HGB BLD-MCNC: 10.3 G/DL (ref 13.5–17.5)
MAGNESIUM SERPL-MCNC: 1.96 MG/DL (ref 1.6–2.4)
MCH RBC QN AUTO: 28.1 PG (ref 26–34)
MCHC RBC AUTO-ENTMCNC: 31.4 G/DL (ref 32–36)
MCV RBC AUTO: 90 FL (ref 80–100)
NRBC BLD-RTO: 0 /100 WBCS (ref 0–0)
PHOSPHATE SERPL-MCNC: 3.3 MG/DL (ref 2.5–4.9)
PLATELET # BLD AUTO: 204 X10*3/UL (ref 150–450)
POTASSIUM SERPL-SCNC: 4.2 MMOL/L (ref 3.5–5.3)
RBC # BLD AUTO: 3.66 X10*6/UL (ref 4.5–5.9)
SCAN RESULT: ABNORMAL
SODIUM SERPL-SCNC: 139 MMOL/L (ref 136–145)
VALPROATE FREE SERPL-MCNC: 3.9 UG/ML (ref 4–30)
WBC # BLD AUTO: 6.4 X10*3/UL (ref 4.4–11.3)

## 2023-11-10 PROCEDURE — 2500000004 HC RX 250 GENERAL PHARMACY W/ HCPCS (ALT 636 FOR OP/ED)

## 2023-11-10 PROCEDURE — 1100000001 HC PRIVATE ROOM DAILY

## 2023-11-10 PROCEDURE — 99232 SBSQ HOSP IP/OBS MODERATE 35: CPT | Performed by: STUDENT IN AN ORGANIZED HEALTH CARE EDUCATION/TRAINING PROGRAM

## 2023-11-10 PROCEDURE — 2500000004 HC RX 250 GENERAL PHARMACY W/ HCPCS (ALT 636 FOR OP/ED): Performed by: STUDENT IN AN ORGANIZED HEALTH CARE EDUCATION/TRAINING PROGRAM

## 2023-11-10 PROCEDURE — 85027 COMPLETE CBC AUTOMATED: CPT | Performed by: STUDENT IN AN ORGANIZED HEALTH CARE EDUCATION/TRAINING PROGRAM

## 2023-11-10 PROCEDURE — 36415 COLL VENOUS BLD VENIPUNCTURE: CPT | Performed by: STUDENT IN AN ORGANIZED HEALTH CARE EDUCATION/TRAINING PROGRAM

## 2023-11-10 PROCEDURE — 2500000001 HC RX 250 WO HCPCS SELF ADMINISTERED DRUGS (ALT 637 FOR MEDICARE OP): Performed by: STUDENT IN AN ORGANIZED HEALTH CARE EDUCATION/TRAINING PROGRAM

## 2023-11-10 PROCEDURE — 2500000001 HC RX 250 WO HCPCS SELF ADMINISTERED DRUGS (ALT 637 FOR MEDICARE OP): Performed by: NURSE PRACTITIONER

## 2023-11-10 PROCEDURE — 96372 THER/PROPH/DIAG INJ SC/IM: CPT | Performed by: STUDENT IN AN ORGANIZED HEALTH CARE EDUCATION/TRAINING PROGRAM

## 2023-11-10 PROCEDURE — 96372 THER/PROPH/DIAG INJ SC/IM: CPT | Performed by: NURSE PRACTITIONER

## 2023-11-10 PROCEDURE — 92610 EVALUATE SWALLOWING FUNCTION: CPT | Mod: GN | Performed by: SPEECH-LANGUAGE PATHOLOGIST

## 2023-11-10 PROCEDURE — 2500000004 HC RX 250 GENERAL PHARMACY W/ HCPCS (ALT 636 FOR OP/ED): Performed by: NURSE PRACTITIONER

## 2023-11-10 PROCEDURE — 83735 ASSAY OF MAGNESIUM: CPT | Performed by: STUDENT IN AN ORGANIZED HEALTH CARE EDUCATION/TRAINING PROGRAM

## 2023-11-10 PROCEDURE — 2500000002 HC RX 250 W HCPCS SELF ADMINISTERED DRUGS (ALT 637 FOR MEDICARE OP, ALT 636 FOR OP/ED): Performed by: NURSE PRACTITIONER

## 2023-11-10 PROCEDURE — 82947 ASSAY GLUCOSE BLOOD QUANT: CPT

## 2023-11-10 PROCEDURE — 96372 THER/PROPH/DIAG INJ SC/IM: CPT

## 2023-11-10 PROCEDURE — 99233 SBSQ HOSP IP/OBS HIGH 50: CPT | Performed by: STUDENT IN AN ORGANIZED HEALTH CARE EDUCATION/TRAINING PROGRAM

## 2023-11-10 PROCEDURE — 99232 SBSQ HOSP IP/OBS MODERATE 35: CPT | Performed by: CLINICAL NURSE SPECIALIST

## 2023-11-10 PROCEDURE — 84100 ASSAY OF PHOSPHORUS: CPT | Performed by: STUDENT IN AN ORGANIZED HEALTH CARE EDUCATION/TRAINING PROGRAM

## 2023-11-10 RX ORDER — ARIPIPRAZOLE 5 MG/1
5 TABLET ORAL DAILY
Status: DISCONTINUED | OUTPATIENT
Start: 2023-11-10 | End: 2023-11-14 | Stop reason: HOSPADM

## 2023-11-10 RX ORDER — CARVEDILOL 3.12 MG/1
3.12 TABLET ORAL
Status: DISCONTINUED | OUTPATIENT
Start: 2023-11-10 | End: 2023-11-10

## 2023-11-10 RX ORDER — HALOPERIDOL 5 MG/1
5 TABLET ORAL EVERY 4 HOURS PRN
Status: DISCONTINUED | OUTPATIENT
Start: 2023-11-10 | End: 2023-11-14

## 2023-11-10 RX ORDER — TRAZODONE HYDROCHLORIDE 50 MG/1
25 TABLET ORAL NIGHTLY PRN
Status: DISCONTINUED | OUTPATIENT
Start: 2023-11-10 | End: 2023-11-14 | Stop reason: HOSPADM

## 2023-11-10 RX ORDER — HALOPERIDOL 5 MG/ML
INJECTION INTRAMUSCULAR
Status: COMPLETED
Start: 2023-11-10 | End: 2023-11-10

## 2023-11-10 RX ORDER — TALC
3 POWDER (GRAM) TOPICAL NIGHTLY
Status: DISCONTINUED | OUTPATIENT
Start: 2023-11-10 | End: 2023-11-10

## 2023-11-10 RX ORDER — HALOPERIDOL 5 MG/ML
5 INJECTION INTRAMUSCULAR ONCE
Status: COMPLETED | OUTPATIENT
Start: 2023-11-10 | End: 2023-11-10

## 2023-11-10 RX ORDER — ACETAMINOPHEN 500 MG
5 TABLET ORAL NIGHTLY
Status: DISCONTINUED | OUTPATIENT
Start: 2023-11-10 | End: 2023-11-14

## 2023-11-10 RX ORDER — HALOPERIDOL 5 MG/ML
2 INJECTION INTRAMUSCULAR ONCE
Status: COMPLETED | OUTPATIENT
Start: 2023-11-10 | End: 2023-11-10

## 2023-11-10 RX ORDER — HYDRALAZINE HYDROCHLORIDE 25 MG/1
25 TABLET, FILM COATED ORAL ONCE
Status: COMPLETED | OUTPATIENT
Start: 2023-11-10 | End: 2023-11-10

## 2023-11-10 RX ORDER — HALOPERIDOL 5 MG/ML
5 INJECTION INTRAMUSCULAR ONCE
Status: DISCONTINUED | OUTPATIENT
Start: 2023-11-10 | End: 2023-11-10

## 2023-11-10 RX ORDER — TAMSULOSIN HYDROCHLORIDE 0.4 MG/1
0.4 CAPSULE ORAL
Status: DISCONTINUED | OUTPATIENT
Start: 2023-11-10 | End: 2023-11-13

## 2023-11-10 RX ORDER — LORAZEPAM 2 MG/ML
1 INJECTION INTRAMUSCULAR ONCE
Status: DISCONTINUED | OUTPATIENT
Start: 2023-11-10 | End: 2023-11-13

## 2023-11-10 RX ORDER — HYDRALAZINE HYDROCHLORIDE 20 MG/ML
10 INJECTION INTRAMUSCULAR; INTRAVENOUS EVERY 6 HOURS PRN
Status: DISCONTINUED | OUTPATIENT
Start: 2023-11-10 | End: 2023-11-14 | Stop reason: HOSPADM

## 2023-11-10 RX ORDER — CARVEDILOL 6.25 MG/1
6.25 TABLET ORAL
Status: DISCONTINUED | OUTPATIENT
Start: 2023-11-10 | End: 2023-11-11

## 2023-11-10 RX ORDER — DIPHENHYDRAMINE HYDROCHLORIDE 50 MG/ML
25 INJECTION INTRAMUSCULAR; INTRAVENOUS ONCE
Status: COMPLETED | OUTPATIENT
Start: 2023-11-10 | End: 2023-11-10

## 2023-11-10 RX ORDER — HYDROXYZINE HYDROCHLORIDE 25 MG/1
50 TABLET, FILM COATED ORAL EVERY 6 HOURS PRN
Status: DISCONTINUED | OUTPATIENT
Start: 2023-11-10 | End: 2023-11-14 | Stop reason: HOSPADM

## 2023-11-10 RX ORDER — OLANZAPINE 2.5 MG/1
2.5 TABLET ORAL EVERY 6 HOURS PRN
Status: DISCONTINUED | OUTPATIENT
Start: 2023-11-10 | End: 2023-11-14 | Stop reason: HOSPADM

## 2023-11-10 RX ADMIN — ATORVASTATIN CALCIUM 10 MG: 20 TABLET, FILM COATED ORAL at 20:14

## 2023-11-10 RX ADMIN — HALOPERIDOL 5 MG: 5 INJECTION INTRAMUSCULAR at 10:12

## 2023-11-10 RX ADMIN — DIPHENHYDRAMINE HYDROCHLORIDE 25 MG: 50 INJECTION INTRAMUSCULAR; INTRAVENOUS at 03:56

## 2023-11-10 RX ADMIN — HYDRALAZINE HYDROCHLORIDE 25 MG: 25 TABLET, FILM COATED ORAL at 10:57

## 2023-11-10 RX ADMIN — HALOPERIDOL LACTATE 2 MG: 5 INJECTION, SOLUTION INTRAMUSCULAR at 03:56

## 2023-11-10 RX ADMIN — HYDRALAZINE HYDROCHLORIDE 10 MG: 20 INJECTION INTRAMUSCULAR; INTRAVENOUS at 11:39

## 2023-11-10 RX ADMIN — Medication 5 MG: at 20:14

## 2023-11-10 RX ADMIN — VALPROIC ACID 250 MG: 250 SOLUTION ORAL at 20:35

## 2023-11-10 RX ADMIN — HALOPERIDOL LACTATE 5 MG: 5 INJECTION, SOLUTION INTRAMUSCULAR at 10:12

## 2023-11-10 RX ADMIN — TAMSULOSIN HYDROCHLORIDE 0.4 MG: 0.4 CAPSULE ORAL at 13:39

## 2023-11-10 RX ADMIN — INSULIN LISPRO 2 UNITS: 100 INJECTION, SOLUTION INTRAVENOUS; SUBCUTANEOUS at 20:15

## 2023-11-10 RX ADMIN — HEPARIN SODIUM 5000 UNITS: 5000 INJECTION INTRAVENOUS; SUBCUTANEOUS at 17:23

## 2023-11-10 RX ADMIN — ARIPIPRAZOLE 5 MG: 5 TABLET ORAL at 20:14

## 2023-11-10 RX ADMIN — CARVEDILOL 6.25 MG: 6.25 TABLET, FILM COATED ORAL at 17:22

## 2023-11-10 ASSESSMENT — COGNITIVE AND FUNCTIONAL STATUS - GENERAL
STANDING UP FROM CHAIR USING ARMS: A LOT
MOVING FROM LYING ON BACK TO SITTING ON SIDE OF FLAT BED WITH BEDRAILS: A LITTLE
DRESSING REGULAR UPPER BODY CLOTHING: A LOT
STANDING UP FROM CHAIR USING ARMS: A LOT
TURNING FROM BACK TO SIDE WHILE IN FLAT BAD: A LITTLE
DAILY ACTIVITIY SCORE: 13
PERSONAL GROOMING: A LOT
TURNING FROM BACK TO SIDE WHILE IN FLAT BAD: A LITTLE
MOBILITY SCORE: 12
CLIMB 3 TO 5 STEPS WITH RAILING: TOTAL
MOVING TO AND FROM BED TO CHAIR: A LOT
WALKING IN HOSPITAL ROOM: TOTAL
MOVING FROM LYING ON BACK TO SITTING ON SIDE OF FLAT BED WITH BEDRAILS: A LITTLE
DRESSING REGULAR LOWER BODY CLOTHING: TOTAL
MOVING TO AND FROM BED TO CHAIR: A LOT
MOBILITY SCORE: 13
HELP NEEDED FOR BATHING: A LOT
TOILETING: A LOT
WALKING IN HOSPITAL ROOM: A LOT
CLIMB 3 TO 5 STEPS WITH RAILING: TOTAL

## 2023-11-10 ASSESSMENT — PAIN - FUNCTIONAL ASSESSMENT
PAIN_FUNCTIONAL_ASSESSMENT: 0-10

## 2023-11-10 ASSESSMENT — PAIN DESCRIPTION - DESCRIPTORS: DESCRIPTORS: ACHING

## 2023-11-10 ASSESSMENT — PAIN SCALES - GENERAL
PAINLEVEL_OUTOF10: 3
PAINLEVEL_OUTOF10: 4
PAINLEVEL_OUTOF10: 0 - NO PAIN

## 2023-11-10 ASSESSMENT — COLUMBIA-SUICIDE SEVERITY RATING SCALE - C-SSRS
2. HAVE YOU ACTUALLY HAD ANY THOUGHTS OF KILLING YOURSELF?: NO
6. HAVE YOU EVER DONE ANYTHING, STARTED TO DO ANYTHING, OR PREPARED TO DO ANYTHING TO END YOUR LIFE?: NO
1. SINCE LAST CONTACT, HAVE YOU WISHED YOU WERE DEAD OR WISHED YOU COULD GO TO SLEEP AND NOT WAKE UP?: NO

## 2023-11-10 NOTE — CARE PLAN
Problem: Skin  Goal: Participates in plan/prevention/treatment measures  Outcome: Progressing     Problem: Skin  Goal: Prevent/minimize sheer/friction injuries  Outcome: Progressing     Problem: Skin  Goal: Promote/optimize nutrition  Outcome: Progressing   The patient's goals for the shift include patient arousable and following commands    The clinical goals for the shift include Patient to remain free from falls/injury and have blood pressures within normal limits.    Patient is making progress towards reaching current goals

## 2023-11-10 NOTE — PROGRESS NOTES
INTERNAL MEDICINE PROGRESS NOTE     BRIEF NARRATIVE      Espinoza Ross is a 67 y.o. male on day 2 of admission presenting with Unresponsive. On arrival, patient was unresponsive but breathing. Placed on NRB. Patient was given narcan 2mg IV without improvement. Patient was intubated due to inability to protect airway. Placed on propofol for sedation. Started on broad spectrum abx.  Patient is currently extubated and transferred out of ICU.  On 11/10/2020 while admitted [facility], patient became severely agitated and restless left his room and was laying down in the hallway.  He was given Haldol with minimal effect, psych was consulted and patient was transferred to [facility].     SUBJECTIVE     Patient seen and evaluated today, he was alert and awake oriented in nonacute distress.  Patient BP was elevated throughout the day, he was given multiple doses of IV antihypertensive, home management resumed with significant improvement in BP.    OBJECTIVE      Visit Vitals  /74 (BP Location: Left arm, Patient Position: Lying)   Pulse 86   Temp 36.5 °C (97.7 °F) (Temporal)   Resp 16        Intake/Output Summary (Last 24 hours) at 11/10/2023 1753  Last data filed at 11/10/2023 1348  Gross per 24 hour   Intake --   Output 175 ml   Net -175 ml       Physical Exam   Physical Exam: awake, not sedated, followed command, maex4, unable to communicate due to intubation   Eyes: R eye pupils +1, L eye pupil +4 sluggish   ENMT: mucous membranes moist, no apparent injury  Head/Neck: NC/AT  Respiratory/Thorax: clear upper lobes, diminished BB no wheezing or rales   Cardiovascular: RRR s1/s2 no s3/s4 no gmr no edema   Gastrointestinal: Nondistended,  soft, non-tender, BS present x 4  : Palomino catheter in place draining clear yellow urine  Musculoskeletal: 2/2 BUE and BLE   Extremities: normal extremities, no edema  Neurological: followed command, awake, alert, unable to stay level of orientation   Skin: Warm and dry, no lesions, no rashes    Current Meds   atorvastatin, 10 mg, oral, Nightly  carvedilol, 6.25 mg, oral, BID with meals  [MAR Hold] esomeprazole, 40 mg, nasoduodenal tube, Daily before breakfast  heparin (porcine), 5,000 Units, subcutaneous, q8h  insulin lispro, 0-5 Units, subcutaneous, q4h  LORazepam, 1 mg, intramuscular, Once  melatonin, 3 mg, oral, Nightly  [MAR Hold] sennosides-docusate sodium, 1 tablet, oral, BID  [MAR Hold] tamsulosin, 0.4 mg, oral, Nightly  tamsulosin, 0.4 mg, oral, Daily  valproic acid, 250 mg, oral, q12h       PRN medications: [MAR Hold] acetaminophen, dextrose 10 % in water (D10W), dextrose, glucagon, haloperidol, hydrALAZINE, hydrOXYzine HCL, OLANZapine, oxygen, traZODone     LABS and IMAGING     WBC   Date Value Ref Range Status   11/10/2023 6.4 4.4 - 11.3 x10*3/uL Final   11/09/2023 5.7 4.4 - 11.3 x10*3/uL Final   11/08/2023 8.3 4.4 - 11.3 x10*3/uL Final     Hemoglobin   Date Value Ref Range Status   11/10/2023 10.3 (L) 13.5 - 17.5 g/dL Final   11/09/2023 10.0 (L) 13.5 - 17.5 g/dL Final   11/08/2023 11.9 (L) 13.5 - 17.5 g/dL Final     Hematocrit   Date Value Ref Range Status   11/10/2023 32.8 (L) 41.0 - 52.0 % Final   11/09/2023 31.0 (L) 41.0 - 52.0 % Final   11/08/2023 37.2 (L) 41.0 - 52.0 % Final     Bicarbonate   Date Value Ref Range Status   11/10/2023 25 21 - 32 mmol/L Final   11/09/2023 26 21 - 32 mmol/L Final   11/09/2023 22 21 - 32 mmol/L Final     Creatinine   Date Value Ref Range Status   11/10/2023 1.04 0.50 - 1.30 mg/dL Final   11/09/2023 1.70 (H) 0.50 - 1.30 mg/dL Final   11/09/2023 2.11 (H) 0.50 - 1.30 mg/dL Final     Calcium   Date Value Ref Range Status   11/10/2023 8.7 8.6 - 10.6 mg/dL Final   11/09/2023  8.5 (L) 8.6 - 10.6 mg/dL Final   11/09/2023 8.3 (L) 8.6 - 10.6 mg/dL Final     INR   Date Value Ref Range Status   11/09/2023 1.3 (H) 0.9 - 1.1 Final   11/08/2023 1.2 (H) 0.9 - 1.1 Final       Transthoracic Echo (TTE) Complete     [facility] [address] [place] [address]                 Tel [phone] and Fax [phone]    TRANSTHORACIC ECHOCARDIOGRAM REPORT       Patient Name:      ALMA SOTELO ZAC German Physician:    [id number] Beena Hernandez MD  Study Date:        11/9/2023            Ordering Provider:    [id number] KAVEH CATALAN  MRN/PID:           [id number]             Fellow:  Accession#:        [id number]         Nurse:  Date of Birth/Age: 1955 / 67      Sonographer:          Andra herrera                                      RDVICK  Gender:            M                    Additional Staff:  Height:            172.72 cm            Admit Date:  Weight:            68.95 kg             Admission Status:     Inpatient -                                                                Routine  BSA:               1.82 m2              Encounter#:           [id number]                                          [address]  Blood Pressure: 167 /77 mmHg    Study Type:    TRANSTHORACIC ECHO (TTE) COMPLETE  Diagnosis/ICD: Bifascicular block-I45.2  Indication:    BBB, Found Unresponsive  CPT Code:      Echo Limited-78790; Doppler Limited-14491; Color Doppler-52711    Patient History:  Pertinent History: BBB, found unresponsive.    Study Detail: The following Echo studies were performed: Doppler, M-Mode, 2D and                color flow. Technically challenging study due to the patient's                lack of cooperation, prominent lung artifact and Pt. terminated                test mid exam. The patient is  intubated. Unable to obtain apical                2-chamber, apical long, subcostal and suprasternal notch view.       PHYSICIAN INTERPRETATION:  Left Ventricle: The left ventricular systolic function is normal. The left ventricular cavity size is normal. Left ventricular diastolic filling was not assessed. Calcified papillary muscle.  Left Atrium: The left atrium is normal in size.  Right Ventricle: The right ventricle was not assessed. Right ventricular systolic function not assessed.  Right Atrium: The right atrium was not well visualized.  Aortic Valve: The aortic valve is probably trileaflet. There is mild aortic valve cusp calcification. There is no evidence of aortic valve regurgitation.  Mitral Valve: The mitral valve is mildly thickened. There is trace mitral valve regurgitation.  Tricuspid Valve: The tricuspid valve was not well visualized. Tricuspid regurgitation was not assessed.  Pulmonic Valve: The pulmonic valve is not well visualized. The pulmonic valve regurgitation was not well visualized.  Pericardium: A pericardial effusion was not well visualized.  Aorta: The aortic root is normal.  In comparison to the previous echocardiogram(s): Although previous studies are available for review, their comparison with the current echocardiogram is clinically irrelevant. Compared with study from 11/18/2021,.       CONCLUSIONS:   1. Left ventricular systolic function is normal.   2. Test is early terminated per patient request.    QUANTITATIVE DATA SUMMARY:  2D MEASUREMENTS:                           Normal Ranges:  Ao Root d:     3.20 cm   (2.0-3.7cm)  LAs:           3.20 cm   (2.7-4.0cm)  IVSd:          0.90 cm   (0.6-1.1cm)  LVPWd:         0.80 cm   (0.6-1.1cm)  LVIDd:         4.10 cm   (3.9-5.9cm)  LVIDs:         3.50 cm  LV Mass Index: 58.1 g/m2  LV % FS        14.6 %    AORTIC VALVE:                         Normal Ranges:  LVOT Diameter: 2.00 cm (1.8-2.4cm)    PULMONIC VALVE:                       Normal  Ranges:  PV Max Nils: 1.0 m/s  (0.6-0.9m/s)  PV Max PG:  3.8 mmHg       [id number] Beena Hernandez MD  Electronically signed on 11/9/2023 at 2:30:12 PM       ** Final **  EEG  IMPRESSION    Impression    This vEEG is indicative of a severe diffuse encephalopathy. No epileptiform discharges or lateralizing signs are seen.    A full report will be scanned into the patient's chart at a later time.    This report has been interpreted and electronically signed by  XR chest 1 view, XR abdomen 1 view  Narrative: Interpreted By:  Kyle Avila  and Paola Harrell   STUDY:  XR CHEST 1 VIEW; XR ABDOMEN 1 VIEW;  11/8/2023 7:55 pm; 11/8/2023  7:56 pm      INDICATION:  Signs/Symptoms:ET adjusted; Signs/Symptoms:NG placement confirmation.      COMPARISON:  Chest radiograph 11/08/2023      ACCESSION NUMBER(S):  [id number]; [id number]      ORDERING CLINICIAN:  ALY CARPENTER      FINDINGS:  AP radiograph of the chest and abdomen were provided.      Tip of the endotracheal tube projects 4.0 cm above the martín,  previously 1.3 cm. Tip of the esophageal temperature probe projects  over the mid esophagus, new from prior. Tip of the enteric tube  projects over the proximal stomach.      CARDIOMEDIASTINAL SILHOUETTE:  Cardiomediastinal silhouette is normal in size and configuration.      LUNGS:  Lungs are clear.      ABDOMEN:  Nonobstructive bowel gas pattern. No evidence of pneumoperitoneum on  this limited supine radiograph. Rounded calcific density overlying  the right upper quadrant, likely relates to gallbladder calcification.      BONES:  No acute osseous changes.      Impression: 1. Interval retraction of the endotracheal tube, now projecting 4.0  cm above the martín.  2. No evidence of acute cardiopulmonary process.  3. Nonobstructive bowel gas pattern.  4. Calcific density overlying the right upper quadrant, likely  relates to gallbladder calcifications. Correlate with right upper  quadrant ultrasound as clinically indicated.       I personally reviewed the images/study and I agree with the findings  as stated by Resident Julio Cesar Guevara. This study was interpreted at  [facility], [place], [place].      MACRO:  None      Signed by: Kyle Avila 11/9/2023 8:09 AM  Dictation workstation:   [id number]  ECG 12 lead  Please see ED Provider Note for formal interpretation  Confirmed by Mayra Napier ([id number]) on 11/9/2023 1:44:52 AM       ASSESSMENT / PLANS      #Acute encephalopathy  #hx of Paranoid schizophrenia. Polysubstance use disorder in the past.   #Dementia.  Presented with Acute encephalopathy: Ddx medication vs metabolic vs arrhythmia vs seizures   - Neurology consulted, VEEG done, acute metabolic encephalopathy, no seizures   - PSYCH consult to advice with resuming home meds   - Home med: Melatonin, aripiprazole 30 mg daily, divalproex sodium 250 mg BID, Donepezil HCL 10 mg daily, gabapentin 200 mb BID, trazodone 25 mg at HS, trileptal 300 mg BID, haldol 1 mg prn for agitation, Hydroxyzine hcl 50 mg prn for anxiety.   -PT/OT  -11/08 CT head showed: no new acute findings.   -Per psych recommendation:  - Recommend restarting Abilify at 5mg PO daily (home dose 30mg)  - Continue with Depakene 250mg PO BID  - Continue with Melatonin 3mg PO at night  - Recommend starting nicotine replacement therapy  PRN:  - Continue with Olanzapine 2.5mg PO/IM (if unable to take PO) Q6H PRN agitation  - Discontinue PRN Haldol  - Continue with Trazodone 25mg PO PRN insomnia     #Acute hypoxic respiratory failure   presented to the ED with GCS<8, intubated for airway protection  Now extubated 11/09 at noon   - wean o2 per sat>92%      # hx of HTN and HFpEF 55%.   Presented with bradycardia with HR 40s (resolved), noted new RBBB, no ST changes, negative troponin, normal lactate   - order ECG showed QTC>512 ms , RBBB, L axix devation. No ST changes   - Holding home med (Atorvastatin 10 mg daily, Carvedilol 3.125 mg BD, furosemide  20 mg daily, lisinopril 40 mg daily  -Continue home meds, will hold lisinopril and Lasix provided ANNETTE.  We will increase the dose of Coreg to 6.25 twice daily  -New echo 11/18/2021 ECHO LVSF normal, EF 55%. Impaired relaxed pattern LVD filling.       hx of GERD, c/o dysphagia   diet (Noted change in diet texture at the facility from regular to mechanical soft)   - consult SLP to evaluate swallow, no dentition   - Bowel Regimen: Consistent carb  - Daily RFP   - PPI  Home med (omprazole 20 mg daily, vitamin B6, vitamin D,      # hx of BPH.   Presented with non oliguric ANNETTE, likely prerenal causes (reported pt with poor oral intake prior days to this admission and pt taking furosemide at the facility)  - order urine electrolyte to evaluate ANNETTE causes   - Daily wt and Strict I&Os  - Daily RFP  - Holding home med (Tamsulonin 0.4 mg daily for BPH)   - order Ammonia level   - Holding home lactulose 10 mg TID (per records takes it for constipation)      Anemia of chronic disease   -Daily CBC  - Holding home Ferrous sulfate 325 mg BID      hx of IDDM II (HgbA1C 7.8% on 06/2023)   - cont ISS  - consider restarting long acting insulin   - Home med (lantus 32 units at HS and ISS)   - Hypoglycemia protocol      P: heparin subcutaneous, PPI   F prn to keep net even   E replete   N NPO   A: ETT/Candelario ONOFRE 11/08      CODE status FULL CODE   Yunior Kelly  Contact Number(s): [phone]  Contact Relationship: guardian     Dispo: not medically ready for discharge.     Xochitl Reyna MD

## 2023-11-10 NOTE — PROGRESS NOTES
Transitional Care Coordination Progress Note:  Patient discussed during interdisciplinary rounds.   Team members present: SOFIYA STEPHENS  Plan per Medical/Surgical team: [facility] team  Payor: [facility]  Discharge disposition: SNF  Potential Barriers: None  ADOD: 2 days    Reached out to the legal guardian Milton Hare to discuss POC. Legal guardian states the patient will return to [facility]. Referral sent to [facility].

## 2023-11-10 NOTE — PROGRESS NOTES
SUBJECTIVE  Per chart, code violet was called on patient this morning, as pt noted to have lowered himself to the floor. Haldol was administered and pt was transferred to MPU.    Upon transfer to MPU, pt observed moaning, breathing quickly, and lying in the fetal position in bed. Patient indicated that he was in pain, specifying various places including his hands, and his face. Patient was able to be calmed without use of PRN medications. Patient's breathing did become less labored and better regulated upon calming down. Nursing staff took vitals, and syst BP noted to be >200mmHg for which rapid response called and medical provider notified. Pt administered Hydralazine.     Interview limited given the above events; however, pt was pleasant and did try to cooperate with assessment. He was oriented to self, location, and month/year.       OBJECTIVE    VITALS      11/10/2023     4:11 AM 11/10/2023     8:00 AM 11/10/2023    10:42 AM 11/10/2023    10:54 AM 11/10/2023    11:18 AM 11/10/2023    11:21 AM 11/10/2023     1:48 PM   Vitals   Systolic 174 199 219 205 209 207 138   Diastolic 84 91 97 99 107 111 78   Heart Rate 72 62 69 74 72     Temp 37.1 °C (98.8 °F) 36.6 °C (97.9 °F) 36.5 °C (97.7 °F)       Resp 16 17  18           MENTAL STATUS EXAM  General/appearance: appears older than stated age, appears to be in acute distress upon transfer to MPU - moaning loudly and curled in the fetal position in bed  Attitude/Behavior: was pleasant once pt able to be calmed down; did try to cooperate with interview   Motor Activity: No PMR, EPS or Tremors, but +oral TD noted; Gait was not observed   Speech: soft volume, quick rate, somewhat mumbled at times  Mood: did not state  Affect: distressed, anxious, mildly labile  Thought Process: seemingly linear responses to simple questions, though assessment limited given need to call rapid response for BP  Thought Content: assessment limited given need to call rapid response for BP. Per  prior Psych CL note (11/9) pt had expressed some vague HI without target or plan.  Thought Perception: assessment limited given need to call rapid response; patient did not appear to be reacting to internal stimuli    CURRENT MEDICATIONS  Scheduled medications  atorvastatin, 10 mg, oral, Nightly  carvedilol, 6.25 mg, oral, BID with meals  [MAR Hold] esomeprazole, 40 mg, nasoduodenal tube, Daily before breakfast  heparin (porcine), 5,000 Units, subcutaneous, q8h  insulin lispro, 0-5 Units, subcutaneous, q4h  LORazepam, 1 mg, intramuscular, Once  melatonin, 3 mg, oral, Nightly  [MAR Hold] sennosides-docusate sodium, 1 tablet, oral, BID  [MAR Hold] tamsulosin, 0.4 mg, oral, Nightly  tamsulosin, 0.4 mg, oral, Daily  valproic acid, 250 mg, oral, q12h        Continuous medications       PRN medications  PRN medications: [MAR Hold] acetaminophen, dextrose 10 % in water (D10W), dextrose, glucagon, haloperidol, hydrALAZINE, hydrOXYzine HCL, OLANZapine, oxygen, traZODone     LABS  Results for orders placed or performed during the hospital encounter of 11/08/23 (from the past 24 hour(s))   POCT GLUCOSE   Result Value Ref Range    POCT Glucose 121 (H) 74 - 99 mg/dL   Renal function panel   Result Value Ref Range    Glucose 112 (H) 74 - 99 mg/dL    Sodium 139 136 - 145 mmol/L    Potassium 4.2 3.5 - 5.3 mmol/L    Chloride 104 98 - 107 mmol/L    Bicarbonate 26 21 - 32 mmol/L    Anion Gap 13 10 - 20 mmol/L    Urea Nitrogen 28 (H) 6 - 23 mg/dL    Creatinine 1.70 (H) 0.50 - 1.30 mg/dL    eGFR 44 (L) >60 mL/min/1.73m*2    Calcium 8.5 (L) 8.6 - 10.6 mg/dL    Phosphorus 5.3 (H) 2.5 - 4.9 mg/dL    Albumin 3.2 (L) 3.4 - 5.0 g/dL   POCT GLUCOSE   Result Value Ref Range    POCT Glucose 70 (L) 74 - 99 mg/dL   POCT GLUCOSE   Result Value Ref Range    POCT Glucose 101 (H) 74 - 99 mg/dL   POCT GLUCOSE   Result Value Ref Range    POCT Glucose 162 (H) 74 - 99 mg/dL   POCT GLUCOSE   Result Value Ref Range    POCT Glucose 154 (H) 74 - 99 mg/dL    POCT GLUCOSE   Result Value Ref Range    POCT Glucose 135 (H) 74 - 99 mg/dL   CBC   Result Value Ref Range    WBC 6.4 4.4 - 11.3 x10*3/uL    nRBC 0.0 0.0 - 0.0 /100 WBCs    RBC 3.66 (L) 4.50 - 5.90 x10*6/uL    Hemoglobin 10.3 (L) 13.5 - 17.5 g/dL    Hematocrit 32.8 (L) 41.0 - 52.0 %    MCV 90 80 - 100 fL    MCH 28.1 26.0 - 34.0 pg    MCHC 31.4 (L) 32.0 - 36.0 g/dL    RDW 13.6 11.5 - 14.5 %    Platelets 204 150 - 450 x10*3/uL   Renal function panel   Result Value Ref Range    Glucose 125 (H) 74 - 99 mg/dL    Sodium 139 136 - 145 mmol/L    Potassium 4.2 3.5 - 5.3 mmol/L    Chloride 104 98 - 107 mmol/L    Bicarbonate 25 21 - 32 mmol/L    Anion Gap 14 10 - 20 mmol/L    Urea Nitrogen 18 6 - 23 mg/dL    Creatinine 1.04 0.50 - 1.30 mg/dL    eGFR 79 >60 mL/min/1.73m*2    Calcium 8.7 8.6 - 10.6 mg/dL    Phosphorus 3.3 2.5 - 4.9 mg/dL    Albumin 3.4 3.4 - 5.0 g/dL   Magnesium   Result Value Ref Range    Magnesium 1.96 1.60 - 2.40 mg/dL   Green Top   Result Value Ref Range    Extra Tube Hold for add-ons.         IMAGING  Transthoracic Echo (TTE) Complete    Result Date: 11/9/2023   [facility] [address] [place] [address]                Tel [phone] and Fax [phone] TRANSTHORACIC ECHOCARDIOGRAM REPORT  Patient Name:      ALMA German Physician:    [id number] Beena Hernandez MD Study Date:        11/9/2023            Ordering Provider:    [id number] KAVEH CATALAN MRN/PID:           [id number]             Fellow: Accession#:        [id number]         Nurse: Date of Birth/Age: 1955 / 67      Sonographer:          Andra herrera                                      RDCS Gender:            M                    Additional Staff: Height:            172.72 cm            Admit Date: Weight:            68.95 kg             Admission  Status:     Inpatient -                                                               Routine BSA:               1.82 m2              Encounter#:           [id number]                                         Department Location:  [place] [address] MICU Blood Pressure: 167 /77 mmHg Study Type:    TRANSTHORACIC ECHO (TTE) COMPLETE Diagnosis/ICD: Bifascicular block-I45.2 Indication:    BBB, Found Unresponsive CPT Code:      Echo Limited-76019; Doppler Limited-46208; Color Doppler-39256 Patient History: Pertinent History: BBB, found unresponsive. Study Detail: The following Echo studies were performed: Doppler, M-Mode, 2D and               color flow. Technically challenging study due to the patient's               lack of cooperation, prominent lung artifact and Pt. terminated               test mid exam. The patient is intubated. Unable to obtain apical               2-chamber, apical long, subcostal and suprasternal notch view.  PHYSICIAN INTERPRETATION: Left Ventricle: The left ventricular systolic function is normal. The left ventricular cavity size is normal. Left ventricular diastolic filling was not assessed. Calcified papillary muscle. Left Atrium: The left atrium is normal in size. Right Ventricle: The right ventricle was not assessed. Right ventricular systolic function not assessed. Right Atrium: The right atrium was not well visualized. Aortic Valve: The aortic valve is probably trileaflet. There is mild aortic valve cusp calcification. There is no evidence of aortic valve regurgitation. Mitral Valve: The mitral valve is mildly thickened. There is trace mitral valve regurgitation. Tricuspid Valve: The tricuspid valve was not well visualized. Tricuspid regurgitation was not assessed. Pulmonic Valve: The pulmonic valve is not well visualized. The pulmonic valve regurgitation was not well visualized. Pericardium: A pericardial effusion was not well visualized. Aorta: The aortic root is normal. In comparison to the  previous echocardiogram(s): Although previous studies are available for review, their comparison with the current echocardiogram is clinically irrelevant. Compared with study from 11/18/2021,.  CONCLUSIONS:  1. Left ventricular systolic function is normal.  2. Test is early terminated per patient request. QUANTITATIVE DATA SUMMARY: 2D MEASUREMENTS:                          Normal Ranges: Ao Root d:     3.20 cm   (2.0-3.7cm) LAs:           3.20 cm   (2.7-4.0cm) IVSd:          0.90 cm   (0.6-1.1cm) LVPWd:         0.80 cm   (0.6-1.1cm) LVIDd:         4.10 cm   (3.9-5.9cm) LVIDs:         3.50 cm LV Mass Index: 58.1 g/m2 LV % FS        14.6 % AORTIC VALVE:                        Normal Ranges: LVOT Diameter: 2.00 cm (1.8-2.4cm) PULMONIC VALVE:                      Normal Ranges: PV Max Nils: 1.0 m/s  (0.6-0.9m/s) PV Max PG:  3.8 mmHg  [id number] Beena Hernandez MD Electronically signed on 11/9/2023 at 2:30:12 PM  ** Final **     EEG    IMPRESSION Impression This vEEG is indicative of a severe diffuse encephalopathy. No epileptiform discharges or lateralizing signs are seen. A full report will be scanned into the patient's chart at a later time. This report has been interpreted and electronically signed by    XR chest 1 view    Result Date: 11/9/2023  Interpreted By:  Kyle Avila and Ebai Jerky STUDY: XR CHEST 1 VIEW; XR ABDOMEN 1 VIEW;  11/8/2023 7:55 pm; 11/8/2023 7:56 pm   INDICATION: Signs/Symptoms:ET adjusted; Signs/Symptoms:NG placement confirmation.   COMPARISON: Chest radiograph 11/08/2023   ACCESSION NUMBER(S): [id number]; [id number]   ORDERING CLINICIAN: ALY CARPENTER   FINDINGS: AP radiograph of the chest and abdomen were provided.   Tip of the endotracheal tube projects 4.0 cm above the martín, previously 1.3 cm. Tip of the esophageal temperature probe projects over the mid esophagus, new from prior. Tip of the enteric tube projects over the proximal stomach.   CARDIOMEDIASTINAL SILHOUETTE:  Cardiomediastinal silhouette is normal in size and configuration.   LUNGS: Lungs are clear.   ABDOMEN: Nonobstructive bowel gas pattern. No evidence of pneumoperitoneum on this limited supine radiograph. Rounded calcific density overlying the right upper quadrant, likely relates to gallbladder calcification.   BONES: No acute osseous changes.       1. Interval retraction of the endotracheal tube, now projecting 4.0 cm above the martín. 2. No evidence of acute cardiopulmonary process. 3. Nonobstructive bowel gas pattern. 4. Calcific density overlying the right upper quadrant, likely relates to gallbladder calcifications. Correlate with right upper quadrant ultrasound as clinically indicated.   I personally reviewed the images/study and I agree with the findings as stated by Resident Julio Cesar Guevara. This study was interpreted at [place], [place].   MACRO: None   Signed by: Kyle Avila 11/9/2023 8:09 AM Dictation workstation:   [id number]    XR abdomen 1 view    Result Date: 11/9/2023  Interpreted By:  Kyle Avila  and Paola Harrell STUDY: XR CHEST 1 VIEW; XR ABDOMEN 1 VIEW;  11/8/2023 7:55 pm; 11/8/2023 7:56 pm   INDICATION: Signs/Symptoms:ET adjusted; Signs/Symptoms:NG placement confirmation.   COMPARISON: Chest radiograph 11/08/2023   ACCESSION NUMBER(S): [id number]; [id number]   ORDERING CLINICIAN: ALY CARPENTER   FINDINGS: AP radiograph of the chest and abdomen were provided.   Tip of the endotracheal tube projects 4.0 cm above the martín, previously 1.3 cm. Tip of the esophageal temperature probe projects over the mid esophagus, new from prior. Tip of the enteric tube projects over the proximal stomach.   CARDIOMEDIASTINAL SILHOUETTE: Cardiomediastinal silhouette is normal in size and configuration.   LUNGS: Lungs are clear.   ABDOMEN: Nonobstructive bowel gas pattern. No evidence of pneumoperitoneum on this limited supine radiograph. Rounded calcific  density overlying the right upper quadrant, likely relates to gallbladder calcification.   BONES: No acute osseous changes.       1. Interval retraction of the endotracheal tube, now projecting 4.0 cm above the martín. 2. No evidence of acute cardiopulmonary process. 3. Nonobstructive bowel gas pattern. 4. Calcific density overlying the right upper quadrant, likely relates to gallbladder calcifications. Correlate with right upper quadrant ultrasound as clinically indicated.   I personally reviewed the images/study and I agree with the findings as stated by Resident Julio Cesar Guevara. This study was interpreted at [facility], [place], [place].   MACRO: None   Signed by: Kyle Avila 11/9/2023 8:09 AM Dictation workstation:   [id number]    ECG 12 lead    Result Date: 11/9/2023  Please see ED Provider Note for formal interpretation Confirmed by Mayra Napier ([id number]) on 11/9/2023 1:44:52 AM       PSYCHIATRIC RISK ASSESSMENT  Homicide: low-moderate, Given pt's history of aggression, legal,  and weapons training history   Suicide: low-moderate, per two prior remote suicide attempts/gestures     Medication Consent  N/A - Consult Service    Assessment:  68y/o M  w/PMSHx of: Dementia, Paranoid Schizophrenia - Paranoid Type, cPTSD, Polysubstance Abuse (Etoh, Tobacco, Cannabis, Cocaine, and Opioids), Medication-Induced Tardive Dyskinesia, TBI w/+/- LOC, Insomnia, Bradycardia, Unsteady Gait, GERD, Pancreatitis, Hepatitis  C+(unclear if treated and viral load), HTN, BPH, Penile Implant, and he has a Court-Appointed Legal-Guardian (Mr. Yunior Hare), who presented to [facility] ED by EMS from his SNF [facility]) on 11/8/2023, after being found down and unresponsive at his nursing home facility. Medical course has been complicated by intubation and MICU stay; extubated 11/9. Psychiatry consulted for medication management.    Update 11/10: Pt transferred to MPU after code violet  called this morning. Upon transfer to MPU, pt appeared acutely distressed, but was able to be calmed slightly without the need of PRN medication. He was alert and oriented to self, month/year, and location. A rapid response was called due to syst BP >200's, and patient administered BP medication. Please refer to plan below for recommendations regarding psychotropic medications.    Impression:  Delirium, Multifactorial, Acute, with Mixed Activity   Neurocognitive Impairment (Dementia, Unspecified Type), per hx  Paranoid Schizophrenia - Paranoid Type, per hx  cPTSD, per hx  Medication-Induced Tardive Dyskinesia, per hx  Polysubstance use disorder (alcohol, tobacco, cannabis, stimulant, opioid) per hx    Recommendations:  Safety/Monitoring:  Patient does not meet criteria for inpatient psychiatric admission  Patient does not require 1:1 observation, given enhanced safety features. Defer to primary team.  Daily interdisciplinary safety and planning huddle with Psychiatry  Video monitoring as with all patients on the MPU  Psychiatry will follow patient daily while on the MPU    Medications:  - Recommend restarting Abilify at 5mg PO daily (home dose 30mg)  - Continue with Depakene 250mg PO BID  - Continue with Melatonin 3mg PO at night  - Recommend starting nicotine replacement therapy    Note: Please refer to Psychiatry Consult documentation (11/09) for full list of patient's home medications and dosages.    PRN:  - Continue with Olanzapine 2.5mg PO/IM (if unable to take PO) Q6H PRN agitation  - Discontinue PRN Haldol  - Continue with Trazodone 25mg PO PRN insomnia    Recommendations relayed to medical provider.    Considerations:  Music, , and recreational therapy as indicated    Disposition/Discharge Planning:  SW following, appreciate assistance  --Court-Appointed Legal-Guardian since 2012, Mr. Yunior Hare.       Jaron Sapp MD

## 2023-11-10 NOTE — CARE PLAN
The patient's goals for the shift include patient arousable and following commands    The clinical goals for the shift include Patient to remain free from falls/injury and have blood pressures within normal limits.    Over the shift, the patient is making progress towards meeting goals.

## 2023-11-10 NOTE — PROGRESS NOTES
Vancomycin Dosing by Pharmacy- Cessation of Therapy    Consult to pharmacy for vancomycin dosing has been discontinued by the prescriber, pharmacy will sign off at this time.    Please call pharmacy if there are further questions or re-enter a consult if vancomycin is resumed.     Kelsi Napier, PharmD

## 2023-11-10 NOTE — PROGRESS NOTES
Espinoza Ross is a 67 y.o. male on day 2 of admission presenting with Unresponsive.    Subjective   Careport reviewed; noted that patient can return to [facility] when he is medically ready as he is a bed hold.    -Corin MCKINNEY MA, LSW  [phone] or [facility]  Care Transitions

## 2023-11-10 NOTE — PROGRESS NOTES
"Speech-Language Pathology  Adult Inpatient Clinical Bedside Swallow Evaluation    Patient Name: Espinoza Ross  MRN: [id number]  Today's Date: 11/10/2023   Start Time: 0900  Stop Time: 0930  Time Calculation (min): 30    History of Present Illness:   Espinoza Ross is a 67 y.o. male presenting after being found down. .     68 y/o M with PMH of DM (HbA1c 7.8% 6/2023), schizophrenia, [facility] resident who presented to the ED on 11/8 via EMS for altered mental status. On arrival, patient was unresponsive but breathing. Placed on NRB. Patient was given narcan 2mg IV without improvement. Patient was intubated due to inability to protect airway. Placed on propofol for sedation. Started on broad spectrum abx.    Assessment:   Clinical bedside swallow evaluation completed. Pt sitting up in chair upon SLP arrival; sitter present for duration of exam. A&O to self and place. Unwilling to complete oral motor examination or produce a volitional cough. Constant rotary chewing pattern with tongue throughout exam. Pt refused all PO trials stating, \"I am giving my 5th amendment right\", \"You better get away from me\" and \"I don't need a philosophy test\". SLP attempted to provide education re: need for swallow evaluation. Increased agitation noted. SLP unable to determine least restrictive diet at this time. Defer to MD for continuation of PO intake re: risk/benefit analysis. Will hold off on repeat swallow assessment until pt willing to safely participate. Nursing aware of recommendations.      Recommendations:  Unable to determine least restrictive diet  -defer to MD for continuation of PO intake re: risk/benefit analysis       Plan:  SLP Services Indicated: Yes  Frequency: Other when/if pt willing to safely participate  Discussed POC with patient  SLP - OK to Discharge    Pain:   0-10  0 = No pain.     Inpatient Education:  Extensive education provided to patient regarding current swallow function, " recommendations/results, and POC.      Consultations/Referrals/Coordination of Services:   N/A

## 2023-11-10 NOTE — OR NURSING
0724 received report. Patient awake, alert x 2-3. Patient is not compliant and not following commands.    Patient states he need to go to the [facility].       Code Violent called on patient. Patient lowered himself to the floor and was rolling around. Pillow was given to patient while on the floor. Security came to floor. Security was able to get patient to cart. Haldol given IM. Safety maintained. Patient taken to [facility] were he was supposed to go prior shift.

## 2023-11-10 NOTE — NURSING NOTE
Pt transferred to [facility]. Report received from JORGE nurse. Pt arrived to unit via WC escorted by [facility] and nursing staff.  Pt A&Ox3 disoriented to situation.  Impaired vision, garbled speech. Belongings brought with patient. Pt compliant with care at this current time.  Nursing will continue to monitor.

## 2023-11-10 NOTE — NURSING NOTE
"02:30 patient's bed alarm went off. Patient found trying to get out of bed. States \"I'm going out there!\" Patient was reoriented to place and time. States \"I don't care what time it is. I'm getting out this room!\" Attempted to keep patient in bed, but was very aggressive and combative. He then crawled on the floor to the hallway. Code violet was called. Charge RN and other floor nurses assisting in deescalating the situation but pt insisting on leaving and that he is not and cannot go back in the room and stated \"I ain't going back in there! [facility]\" On-call MD notified. Orders received for  IM Ativan, Benadryl and Haldol. Patient was still sitting in hallway. Per my nursing judgement, it was unsafe to give these meds at this time as patient was still agitated with unsteady gait.      02:40: Security team arrived at the floor. There was no Dr. or supervisor to the unit at this time. Security waited for a few minutes and had to leave to another situation in the ED.  Other nurses, myself, charge RN and unit secretary were left to manage the situation.     03:30 Supervisor arrives to floor. At this time the patient is still sitting in the [address] way adamant about leaving. Charge Nurse informs supervisor of situation. Supervisor instructs charge nurse to call bed management and left. With the help of the unit secretary, bed management was contacted and a bed was obtained for the patient on [facility]. Patient was then transferred to [facility] via wheelchair with security present.    03:56 one time dose Benadryl 25mg and Haldol 2mg given at bedside on [facility] with receiving nurse and security present.  "
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
## 2023-11-10 NOTE — CARE PLAN
The patient's goals for the shift include patient arousable and following commands    The clinical goals for the shift include Patient to remain free from falls/injury and have blood pressures within normal limits.    Patient is making adequate progress towards reaching goals.

## 2023-11-10 NOTE — NURSING NOTE
10:43 Rapid Response     Rapid Response called for hypertension.  Patient alert, conversant and asymptomatic.      DACR Dr. Napier at bedside. Patient given po Hydralazine.  Labs sent.    No difference between right and left arm blood pressures  Hospitalist Dr. Reyna at bedside.  Plan for po Coreg.      Bedside nurse present for rapid response and aware of plan.    Encouraged to call with concerns.

## 2023-11-10 NOTE — PROGRESS NOTES
Recreation Therapy Note  Therapy Session  Visit Type: New visit  Session Start Time: 1305  Session End Time: 1330  Intervention Delivery: In-person  Conflict of Service: None  Number of family members present: 0  Family Present for Session: None  Family Participation: None  Number of staff members present: 2    Pre-assessment  Unable to Assess Reason: Outcomes not applicable  Mood/Affect: Calm, Cooperative  Verbalized Emotional State:  (none verbalized)    Treatment  Areas of Focus: Normalization, Socialization, Coping  Co-Treatment: Other (comment) (none)  Interruption: No  Patient Fell Asleep at End of Session: No    Post-assessment  Unable to Assess Reason: Outcomes not applicable  Mood/Affect: Appropriate  Verbalized Emotional State: Loneliness (Patient stated that he was lonely and  wanted his daughter to come and visit him and that if she was sick that he would go visit her.  Further stated later in the conversation that his daughter was a teacher in [employer].)  Continue Visiting: Yes  Total Session Time (min): 25 minutes    Narrative  Assessment Detail: Patient was in bed and calm upon apprach.  Plan: To encourage the exploration of safe and effective positive leisure coping skills to manage situational stressors.  Intervention: Patient stated that he couldn't see well enough to engage in an activity but did say he enjoyed gospel music.  Played patient a playlist of gospel music that he sang along to.  Evaluation: Patient was appropriate with behavior in good control throughout the encounter.  Garbled speech and very difficult to understand.  Seemed to enjoy the music.  Follow-up: Will continue to encourage positive leisure coping skills exploration.  Patient Comments:  (n/a)    Assessment:  66y/o M  w/PMSHx of: Dementia, Paranoid Schizophrenia - Paranoid Type, cPTSD, Polysubstance Abuse (Etoh, Tobacco, Cannabis, Cocaine, and Opioids), Medication-Induced Tardive Dyskinesia, TBI w/+/- LOC, Insomnia,  Bradycardia, Unsteady Gait, GERD, Pancreatitis, Hepatitis  C+(unclear if treated and viral load), HTN, BPH, Penile Implant, and he has a Court-Appointed Legal-Guardian (Mr. Yunior Hare), who presented to [facility] ED by EMS from his SNF ([facility]) on 11/8/2023, after being found down and unresponsive at his nursing home facility. Medical course has been complicated by intubation and MICU stay; extubated 11/9. Psychiatry consulted for medication management.

## 2023-11-11 LAB
GABAPENTIN SERPLBLD-MCNC: 4 UG/ML (ref 2–20)
GLUCOSE BLD MANUAL STRIP-MCNC: 121 MG/DL (ref 74–99)
GLUCOSE BLD MANUAL STRIP-MCNC: 180 MG/DL (ref 74–99)
GLUCOSE BLD MANUAL STRIP-MCNC: 204 MG/DL (ref 74–99)
METHYLMALONATE SERPL-SCNC: <0.1 UMOL/L (ref 0–0.4)

## 2023-11-11 PROCEDURE — 2500000004 HC RX 250 GENERAL PHARMACY W/ HCPCS (ALT 636 FOR OP/ED): Performed by: STUDENT IN AN ORGANIZED HEALTH CARE EDUCATION/TRAINING PROGRAM

## 2023-11-11 PROCEDURE — 99231 SBSQ HOSP IP/OBS SF/LOW 25: CPT | Performed by: PSYCHIATRY & NEUROLOGY

## 2023-11-11 PROCEDURE — 2500000001 HC RX 250 WO HCPCS SELF ADMINISTERED DRUGS (ALT 637 FOR MEDICARE OP): Performed by: STUDENT IN AN ORGANIZED HEALTH CARE EDUCATION/TRAINING PROGRAM

## 2023-11-11 PROCEDURE — 82947 ASSAY GLUCOSE BLOOD QUANT: CPT

## 2023-11-11 PROCEDURE — 2500000004 HC RX 250 GENERAL PHARMACY W/ HCPCS (ALT 636 FOR OP/ED): Performed by: NURSE PRACTITIONER

## 2023-11-11 PROCEDURE — 2500000001 HC RX 250 WO HCPCS SELF ADMINISTERED DRUGS (ALT 637 FOR MEDICARE OP): Performed by: NURSE PRACTITIONER

## 2023-11-11 PROCEDURE — 99232 SBSQ HOSP IP/OBS MODERATE 35: CPT | Performed by: STUDENT IN AN ORGANIZED HEALTH CARE EDUCATION/TRAINING PROGRAM

## 2023-11-11 PROCEDURE — 1100000001 HC PRIVATE ROOM DAILY

## 2023-11-11 PROCEDURE — 93010 ELECTROCARDIOGRAM REPORT: CPT | Performed by: INTERNAL MEDICINE

## 2023-11-11 PROCEDURE — 96372 THER/PROPH/DIAG INJ SC/IM: CPT | Performed by: NURSE PRACTITIONER

## 2023-11-11 RX ORDER — AMLODIPINE BESYLATE 10 MG/1
10 TABLET ORAL DAILY
Status: DISCONTINUED | OUTPATIENT
Start: 2023-11-11 | End: 2023-11-14 | Stop reason: HOSPADM

## 2023-11-11 RX ORDER — CARVEDILOL 12.5 MG/1
12.5 TABLET ORAL
Status: DISCONTINUED | OUTPATIENT
Start: 2023-11-11 | End: 2023-11-14

## 2023-11-11 RX ADMIN — ACETAMINOPHEN 650 MG: 325 TABLET ORAL at 17:43

## 2023-11-11 RX ADMIN — CARVEDILOL 12.5 MG: 12.5 TABLET, FILM COATED ORAL at 17:44

## 2023-11-11 RX ADMIN — VALPROIC ACID 250 MG: 250 SOLUTION ORAL at 21:30

## 2023-11-11 RX ADMIN — HEPARIN SODIUM 5000 UNITS: 5000 INJECTION INTRAVENOUS; SUBCUTANEOUS at 17:44

## 2023-11-11 RX ADMIN — Medication 5 MG: at 21:00

## 2023-11-11 RX ADMIN — VALPROIC ACID 250 MG: 250 SOLUTION ORAL at 08:38

## 2023-11-11 RX ADMIN — AMLODIPINE BESYLATE 10 MG: 10 TABLET ORAL at 13:05

## 2023-11-11 RX ADMIN — CARVEDILOL 6.25 MG: 6.25 TABLET, FILM COATED ORAL at 08:29

## 2023-11-11 RX ADMIN — INSULIN LISPRO 1 UNITS: 100 INJECTION, SOLUTION INTRAVENOUS; SUBCUTANEOUS at 18:10

## 2023-11-11 RX ADMIN — HEPARIN SODIUM 5000 UNITS: 5000 INJECTION INTRAVENOUS; SUBCUTANEOUS at 08:23

## 2023-11-11 RX ADMIN — HYDRALAZINE HYDROCHLORIDE 10 MG: 20 INJECTION INTRAMUSCULAR; INTRAVENOUS at 08:24

## 2023-11-11 RX ADMIN — INSULIN LISPRO 2 UNITS: 100 INJECTION, SOLUTION INTRAVENOUS; SUBCUTANEOUS at 13:05

## 2023-11-11 RX ADMIN — ARIPIPRAZOLE 5 MG: 5 TABLET ORAL at 08:23

## 2023-11-11 RX ADMIN — ATORVASTATIN CALCIUM 10 MG: 20 TABLET, FILM COATED ORAL at 21:00

## 2023-11-11 RX ADMIN — TAMSULOSIN HYDROCHLORIDE 0.4 MG: 0.4 CAPSULE ORAL at 07:00

## 2023-11-11 ASSESSMENT — COLUMBIA-SUICIDE SEVERITY RATING SCALE - C-SSRS
6. HAVE YOU EVER DONE ANYTHING, STARTED TO DO ANYTHING, OR PREPARED TO DO ANYTHING TO END YOUR LIFE?: NO
2. HAVE YOU ACTUALLY HAD ANY THOUGHTS OF KILLING YOURSELF?: NO
2. HAVE YOU ACTUALLY HAD ANY THOUGHTS OF KILLING YOURSELF?: NO
1. SINCE LAST CONTACT, HAVE YOU WISHED YOU WERE DEAD OR WISHED YOU COULD GO TO SLEEP AND NOT WAKE UP?: NO
1. SINCE LAST CONTACT, HAVE YOU WISHED YOU WERE DEAD OR WISHED YOU COULD GO TO SLEEP AND NOT WAKE UP?: NO
6. HAVE YOU EVER DONE ANYTHING, STARTED TO DO ANYTHING, OR PREPARED TO DO ANYTHING TO END YOUR LIFE?: NO

## 2023-11-11 ASSESSMENT — COGNITIVE AND FUNCTIONAL STATUS - GENERAL
STANDING UP FROM CHAIR USING ARMS: A LOT
DRESSING REGULAR LOWER BODY CLOTHING: TOTAL
MOVING TO AND FROM BED TO CHAIR: A LOT
MOBILITY SCORE: 12
DAILY ACTIVITIY SCORE: 13
PERSONAL GROOMING: A LOT
TOILETING: A LOT
CLIMB 3 TO 5 STEPS WITH RAILING: TOTAL
DRESSING REGULAR UPPER BODY CLOTHING: A LOT
WALKING IN HOSPITAL ROOM: TOTAL
MOVING FROM LYING ON BACK TO SITTING ON SIDE OF FLAT BED WITH BEDRAILS: A LITTLE
HELP NEEDED FOR BATHING: A LOT
TURNING FROM BACK TO SIDE WHILE IN FLAT BAD: A LITTLE

## 2023-11-11 ASSESSMENT — PAIN SCALES - GENERAL: PAINLEVEL_OUTOF10: 10 - WORST POSSIBLE PAIN
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
## 2023-11-11 NOTE — CARE PLAN
The patient's goals for the shift include patient arousable and following commands    The clinical goals for the shift include Patient to remain free from falls/injury and have blood pressures within normal limits.

## 2023-11-11 NOTE — CARE PLAN
The patient's goals for the shift include patient arousable and following commands    The clinical goals for the shift include Patient will remsin free from falls or injury during this shift of [id number].    Patient slept during the night. Able to follow commands and make needs known.. Sitterremain at patient bedside. Patient safety maintained. Will continue to monitor.

## 2023-11-11 NOTE — PROGRESS NOTES
.                                                                                                                                                                                                                                                                                             INTERNAL MEDICINE PROGRESS NOTE     BRIEF NARRATIVE      Espinoza Ross is a 67 y.o. male on day 3 of admission presenting with Unresponsive. On arrival, patient was unresponsive but breathing. Placed on NRB. Patient was given narcan 2mg IV without improvement. Patient was intubated due to inability to protect airway. Placed on propofol for sedation. Started on broad spectrum abx.  Patient is currently extubated and transferred out of ICU.  On 11/10/2020 while admitted [facility], patient became severely agitated and restless left his room and was laying down in the hallway.  He was given Haldol with minimal effect, psych was consulted and patient was transferred to [facility].     SUBJECTIVE     Patient seen and evaluated today, he was alert and awake oriented in nonacute distress.  Patient is less combative today, BP better controlled this morning.   OBJECTIVE      Visit Vitals  BP (!) 205/94   Pulse 59   Temp 36.6 °C (97.9 °F)   Resp 18        Intake/Output Summary (Last 24 hours) at 11/11/2023 1146  Last data filed at 11/10/2023 2000  Gross per 24 hour   Intake --   Output 275 ml   Net -275 ml         Physical Exam   Physical Exam: awake, not sedated, followed command, maex4, unable to communicate due to intubation   Eyes: R eye pupils +1, L eye pupil +4 sluggish   ENMT: mucous membranes moist, no apparent injury  Head/Neck: NC/AT  Respiratory/Thorax: clear upper lobes, diminished BB no wheezing or rales   Cardiovascular: RRR s1/s2 no s3/s4 no gmr no edema   Gastrointestinal: Nondistended, soft, non-tender, BS present x 4  : Palomino catheter in place draining clear yellow urine  Musculoskeletal: 2/2 BUE and BLE   Extremities: normal  extremities, no edema  Neurological: followed command, awake, alert, unable to stay level of orientation   Skin: Warm and dry, no lesions, no rashes    Current Meds   ARIPiprazole, 5 mg, oral, Daily  atorvastatin, 10 mg, oral, Nightly  carvedilol, 6.25 mg, oral, BID with meals  [MAR Hold] esomeprazole, 40 mg, nasoduodenal tube, Daily before breakfast  heparin (porcine), 5,000 Units, subcutaneous, q8h  insulin lispro, 0-5 Units, subcutaneous, q4h  LORazepam, 1 mg, intramuscular, Once  melatonin, 5 mg, oral, Nightly  [MAR Hold] sennosides-docusate sodium, 1 tablet, oral, BID  [MAR Hold] tamsulosin, 0.4 mg, oral, Nightly  tamsulosin, 0.4 mg, oral, Daily  valproic acid, 250 mg, oral, q12h       PRN medications: [MAR Hold] acetaminophen, dextrose 10 % in water (D10W), dextrose, glucagon, haloperidol, hydrALAZINE, hydrOXYzine HCL, OLANZapine, oxygen, traZODone     LABS and IMAGING     WBC   Date Value Ref Range Status   11/10/2023 6.4 4.4 - 11.3 x10*3/uL Final   11/09/2023 5.7 4.4 - 11.3 x10*3/uL Final     Hemoglobin   Date Value Ref Range Status   11/10/2023 10.3 (L) 13.5 - 17.5 g/dL Final   11/09/2023 10.0 (L) 13.5 - 17.5 g/dL Final     Hematocrit   Date Value Ref Range Status   11/10/2023 32.8 (L) 41.0 - 52.0 % Final   11/09/2023 31.0 (L) 41.0 - 52.0 % Final     Bicarbonate   Date Value Ref Range Status   11/10/2023 25 21 - 32 mmol/L Final   11/09/2023 26 21 - 32 mmol/L Final   11/09/2023 22 21 - 32 mmol/L Final     Creatinine   Date Value Ref Range Status   11/10/2023 1.04 0.50 - 1.30 mg/dL Final   11/09/2023 1.70 (H) 0.50 - 1.30 mg/dL Final   11/09/2023 2.11 (H) 0.50 - 1.30 mg/dL Final     Calcium   Date Value Ref Range Status   11/10/2023 8.7 8.6 - 10.6 mg/dL Final   11/09/2023 8.5 (L) 8.6 - 10.6 mg/dL Final   11/09/2023 8.3 (L) 8.6 - 10.6 mg/dL Final     INR   Date Value Ref Range Status   11/09/2023 1.3 (H) 0.9 - 1.1 Final       Transthoracic Echo (TTE) Complete     [facility], [address] [place] [address],  [place], [place] [address]                 Tel [phone] and Fax [phone]    TRANSTHORACIC ECHOCARDIOGRAM REPORT       Patient Name:      ALMA SOTELO ZAC German Physician:    [id number] Beena Hernandez MD  Study Date:        11/9/2023            Ordering Provider:    [id number] KAVEH MADIHA                                                                ALAYNA  MRN/PID:           [id number]             Fellow:  Accession#:        [id number]         Nurse:  Date of Birth/Age: 1955 / 67      Sonographer:          Andra herrera                                      RD  Gender:            M                    Additional Staff:  Height:            172.72 cm            Admit Date:  Weight:            68.95 kg             Admission Status:     Inpatient -                                                                Routine  BSA:               1.82 m2              Encounter#:           [id number]                                          Department Location:  [place] [address] MICU  Blood Pressure: 167 /77 mmHg    Study Type:    TRANSTHORACIC ECHO (TTE) COMPLETE  Diagnosis/ICD: Bifascicular block-I45.2  Indication:    BBB, Found Unresponsive  CPT Code:      Echo Limited-90584; Doppler Limited-19837; Color Doppler-14162    Patient History:  Pertinent History: BBB, found unresponsive.    Study Detail: The following Echo studies were performed: Doppler, M-Mode, 2D and                color flow. Technically challenging study due to the patient's                lack of cooperation, prominent lung artifact and Pt. terminated                test mid exam. The patient is intubated. Unable to obtain apical                2-chamber, apical long, subcostal and suprasternal notch view.       PHYSICIAN INTERPRETATION:  Left Ventricle: The left ventricular systolic function is normal. The left ventricular cavity size is normal. Left ventricular diastolic  filling was not assessed. Calcified papillary muscle.  Left Atrium: The left atrium is normal in size.  Right Ventricle: The right ventricle was not assessed. Right ventricular systolic function not assessed.  Right Atrium: The right atrium was not well visualized.  Aortic Valve: The aortic valve is probably trileaflet. There is mild aortic valve cusp calcification. There is no evidence of aortic valve regurgitation.  Mitral Valve: The mitral valve is mildly thickened. There is trace mitral valve regurgitation.  Tricuspid Valve: The tricuspid valve was not well visualized. Tricuspid regurgitation was not assessed.  Pulmonic Valve: The pulmonic valve is not well visualized. The pulmonic valve regurgitation was not well visualized.  Pericardium: A pericardial effusion was not well visualized.  Aorta: The aortic root is normal.  In comparison to the previous echocardiogram(s): Although previous studies are available for review, their comparison with the current echocardiogram is clinically irrelevant. Compared with study from 11/18/2021,.       CONCLUSIONS:   1. Left ventricular systolic function is normal.   2. Test is early terminated per patient request.    QUANTITATIVE DATA SUMMARY:  2D MEASUREMENTS:                           Normal Ranges:  Ao Root d:     3.20 cm   (2.0-3.7cm)  LAs:           3.20 cm   (2.7-4.0cm)  IVSd:          0.90 cm   (0.6-1.1cm)  LVPWd:         0.80 cm   (0.6-1.1cm)  LVIDd:         4.10 cm   (3.9-5.9cm)  LVIDs:         3.50 cm  LV Mass Index: 58.1 g/m2  LV % FS        14.6 %    AORTIC VALVE:                         Normal Ranges:  LVOT Diameter: 2.00 cm (1.8-2.4cm)    PULMONIC VALVE:                       Normal Ranges:  PV Max Nils: 1.0 m/s  (0.6-0.9m/s)  PV Max PG:  3.8 mmHg       [id number] Beena Hernandez MD  Electronically signed on 11/9/2023 at 2:30:12 PM       ** Final **  EEG  IMPRESSION    Impression    This vEEG is indicative of a severe diffuse encephalopathy. No epileptiform discharges  or lateralizing signs are seen.    A full report will be scanned into the patient's chart at a later time.    This report has been interpreted and electronically signed by  XR chest 1 view, XR abdomen 1 view  Narrative: Interpreted By:  Kyle Avila,  and Paola Harrell   STUDY:  XR CHEST 1 VIEW; XR ABDOMEN 1 VIEW;  11/8/2023 7:55 pm; 11/8/2023  7:56 pm      INDICATION:  Signs/Symptoms:ET adjusted; Signs/Symptoms:NG placement confirmation.      COMPARISON:  Chest radiograph 11/08/2023      ACCESSION NUMBER(S):  [id number]; [id number]      ORDERING CLINICIAN:  ALY CARPENTER      FINDINGS:  AP radiograph of the chest and abdomen were provided.      Tip of the endotracheal tube projects 4.0 cm above the martín,  previously 1.3 cm. Tip of the esophageal temperature probe projects  over the mid esophagus, new from prior. Tip of the enteric tube  projects over the proximal stomach.      CARDIOMEDIASTINAL SILHOUETTE:  Cardiomediastinal silhouette is normal in size and configuration.      LUNGS:  Lungs are clear.      ABDOMEN:  Nonobstructive bowel gas pattern. No evidence of pneumoperitoneum on  this limited supine radiograph. Rounded calcific density overlying  the right upper quadrant, likely relates to gallbladder calcification.      BONES:  No acute osseous changes.      Impression: 1. Interval retraction of the endotracheal tube, now projecting 4.0  cm above the martín.  2. No evidence of acute cardiopulmonary process.  3. Nonobstructive bowel gas pattern.  4. Calcific density overlying the right upper quadrant, likely  relates to gallbladder calcifications. Correlate with right upper  quadrant ultrasound as clinically indicated.      I personally reviewed the images/study and I agree with the findings  as stated by Resident Julio Cesar Guevara. This study was interpreted at  [place], [place].      MACRO:  None      Signed by: Kyle Avila 11/9/2023 8:09 AM  Dictation  workstation:   [id number]  ECG 12 lead  Please see ED Provider Note for formal interpretation  Confirmed by Mayra Napier ([id number]) on 11/9/2023 1:44:52 AM       ASSESSMENT / PLANS      #Acute encephalopathy  #hx of Paranoid schizophrenia. Polysubstance use disorder in the past.   #Dementia.  Presented with Acute encephalopathy: Ddx medication vs metabolic vs arrhythmia vs seizures   - Neurology consulted, VEEG done, acute metabolic encephalopathy, no seizures   - PSYCH consult to advice with resuming home meds   - Home med: Melatonin, aripiprazole 30 mg daily, divalproex sodium 250 mg BID, Donepezil HCL 10 mg daily, gabapentin 200 mb BID, trazodone 25 mg at HS, trileptal 300 mg BID, haldol 1 mg prn for agitation, Hydroxyzine hcl 50 mg prn for anxiety.   -PT/OT  -11/08 CT head showed: no new acute findings.   -Per psych recommendation:  - Recommend restarting Abilify at 5mg PO daily (home dose 30mg)  - Continue with Depakene 250mg PO BID  - Continue with Melatonin 3mg PO at night  - Recommend starting nicotine replacement therapy  PRN:  - Continue with Olanzapine 2.5mg PO/IM (if unable to take PO) Q6H PRN agitation  - Discontinue PRN Haldol  - Continue with Trazodone 25mg PO PRN insomnia     #Acute hypoxic respiratory failure   presented to the ED with GCS<8, intubated for airway protection  Now extubated 11/09 at noon   - wean o2 per sat>92%   -Continue to monitor respiratory status    #Hypertensive emergency, elevated BP associated with AMS and ANNETTE  -BP better controlled  -Patient home antihypertensive regimen: Carvedilol 3.125 mg twice daily, lisinopril 40 mg daily.  -We will increase the dose of carvedilol to 12.5 mg twice daily today, will continue to hold lisinopril provided ANNETTE.  -We will add amlodipine 10 mg daily for optimal BP control  -IV hydralazine 10 mg every 4 hours for SBP> 170    # hx of HTN and HFpEF 55%.   Presented with bradycardia with HR 40s (resolved), noted new RBBB, no ST changes, negative  troponin, normal lactate   - order ECG showed QTC>512 ms , RBBB, L axix devation. No ST changes   - Holding home med (Atorvastatin 10 mg daily, Carvedilol 3.125 mg BD, furosemide 20 mg daily, lisinopril 40 mg daily  -Continue home meds, will hold lisinopril and Lasix provided ANNETTE.  Continue Coreg  -New echo 11/18/2021 ECHO LVSF normal, EF 55%. Impaired relaxed pattern LVD filling.       hx of GERD, c/o dysphagia   diet (Noted change in diet texture at the facility from regular to mechanical soft)   - consult SLP to evaluate swallow, no dentition   - Bowel Regimen: Consistent carb  - Daily RFP   - PPI  Home med (omprazole 20 mg daily, vitamin B6, vitamin D,      # hx of BPH.   Presented with non oliguric ANNETTE, likely prerenal causes (reported pt with poor oral intake prior days to this admission and pt taking furosemide at the facility)  - order urine electrolyte to evaluate ANNETTE causes   - Daily wt and Strict I&Os  - Daily RFP  - Holding home med (Tamsulonin 0.4 mg daily for BPH)   - order Ammonia level   - Holding home lactulose 10 mg TID (per records takes it for constipation)      Anemia of chronic disease   -Daily CBC  - Holding home Ferrous sulfate 325 mg BID      hx of IDDM II (HgbA1C 7.8% on 06/2023)   - cont ISS  - consider restarting long acting insulin   - Home med (lantus 32 units at HS and ISS)   - Hypoglycemia protocol      P: heparin subcutaneous, PPI   F prn to keep net even   E replete   N NPO   A: ETT/Candelario ONOFRE 11/08      CODE status FULL CODE   Yunior Kelly  Contact Number(s): [phone]  Contact Relationship: guardian     Dispo: not medically ready for discharge.     Xochitl Reyna MD

## 2023-11-12 LAB
BACTERIA BLD CULT: NORMAL
BACTERIA BLD CULT: NORMAL
GLUCOSE BLD MANUAL STRIP-MCNC: 104 MG/DL (ref 74–99)
GLUCOSE BLD MANUAL STRIP-MCNC: 156 MG/DL (ref 74–99)
GLUCOSE BLD MANUAL STRIP-MCNC: 179 MG/DL (ref 74–99)
GLUCOSE BLD MANUAL STRIP-MCNC: 185 MG/DL (ref 74–99)
GLUCOSE BLD MANUAL STRIP-MCNC: 199 MG/DL (ref 74–99)
GLUCOSE BLD MANUAL STRIP-MCNC: 274 MG/DL (ref 74–99)

## 2023-11-12 PROCEDURE — 2500000001 HC RX 250 WO HCPCS SELF ADMINISTERED DRUGS (ALT 637 FOR MEDICARE OP): Performed by: STUDENT IN AN ORGANIZED HEALTH CARE EDUCATION/TRAINING PROGRAM

## 2023-11-12 PROCEDURE — 99232 SBSQ HOSP IP/OBS MODERATE 35: CPT | Performed by: STUDENT IN AN ORGANIZED HEALTH CARE EDUCATION/TRAINING PROGRAM

## 2023-11-12 PROCEDURE — 2500000004 HC RX 250 GENERAL PHARMACY W/ HCPCS (ALT 636 FOR OP/ED): Performed by: NURSE PRACTITIONER

## 2023-11-12 PROCEDURE — 2500000004 HC RX 250 GENERAL PHARMACY W/ HCPCS (ALT 636 FOR OP/ED): Performed by: STUDENT IN AN ORGANIZED HEALTH CARE EDUCATION/TRAINING PROGRAM

## 2023-11-12 PROCEDURE — 1100000001 HC PRIVATE ROOM DAILY

## 2023-11-12 PROCEDURE — 2500000005 HC RX 250 GENERAL PHARMACY W/O HCPCS: Performed by: STUDENT IN AN ORGANIZED HEALTH CARE EDUCATION/TRAINING PROGRAM

## 2023-11-12 PROCEDURE — 99231 SBSQ HOSP IP/OBS SF/LOW 25: CPT | Performed by: PSYCHIATRY & NEUROLOGY

## 2023-11-12 PROCEDURE — 82947 ASSAY GLUCOSE BLOOD QUANT: CPT

## 2023-11-12 PROCEDURE — 96372 THER/PROPH/DIAG INJ SC/IM: CPT | Performed by: NURSE PRACTITIONER

## 2023-11-12 PROCEDURE — 2500000001 HC RX 250 WO HCPCS SELF ADMINISTERED DRUGS (ALT 637 FOR MEDICARE OP): Performed by: NURSE PRACTITIONER

## 2023-11-12 RX ORDER — LIDOCAINE 560 MG/1
1 PATCH PERCUTANEOUS; TOPICAL; TRANSDERMAL DAILY
Status: DISCONTINUED | OUTPATIENT
Start: 2023-11-12 | End: 2023-11-14 | Stop reason: HOSPADM

## 2023-11-12 RX ORDER — IBUPROFEN 200 MG
1 TABLET ORAL DAILY
Status: DISCONTINUED | OUTPATIENT
Start: 2023-11-12 | End: 2023-11-14 | Stop reason: HOSPADM

## 2023-11-12 RX ADMIN — ATORVASTATIN CALCIUM 10 MG: 20 TABLET, FILM COATED ORAL at 20:27

## 2023-11-12 RX ADMIN — CARVEDILOL 12.5 MG: 12.5 TABLET, FILM COATED ORAL at 18:04

## 2023-11-12 RX ADMIN — ARIPIPRAZOLE 5 MG: 5 TABLET ORAL at 08:31

## 2023-11-12 RX ADMIN — CARVEDILOL 12.5 MG: 12.5 TABLET, FILM COATED ORAL at 08:30

## 2023-11-12 RX ADMIN — ACETAMINOPHEN 650 MG: 325 TABLET ORAL at 01:29

## 2023-11-12 RX ADMIN — LIDOCAINE 1 PATCH: 4 PATCH TOPICAL at 08:36

## 2023-11-12 RX ADMIN — Medication 5 MG: at 20:27

## 2023-11-12 RX ADMIN — VALPROIC ACID 250 MG: 250 SOLUTION ORAL at 21:30

## 2023-11-12 RX ADMIN — AMLODIPINE BESYLATE 10 MG: 10 TABLET ORAL at 08:30

## 2023-11-12 RX ADMIN — HEPARIN SODIUM 5000 UNITS: 5000 INJECTION INTRAVENOUS; SUBCUTANEOUS at 18:03

## 2023-11-12 RX ADMIN — HEPARIN SODIUM 5000 UNITS: 5000 INJECTION INTRAVENOUS; SUBCUTANEOUS at 08:30

## 2023-11-12 RX ADMIN — INSULIN LISPRO 1 UNITS: 100 INJECTION, SOLUTION INTRAVENOUS; SUBCUTANEOUS at 12:41

## 2023-11-12 RX ADMIN — INSULIN LISPRO 1 UNITS: 100 INJECTION, SOLUTION INTRAVENOUS; SUBCUTANEOUS at 08:37

## 2023-11-12 RX ADMIN — HEPARIN SODIUM 5000 UNITS: 5000 INJECTION INTRAVENOUS; SUBCUTANEOUS at 00:10

## 2023-11-12 RX ADMIN — INSULIN LISPRO 1 UNITS: 100 INJECTION, SOLUTION INTRAVENOUS; SUBCUTANEOUS at 17:57

## 2023-11-12 RX ADMIN — TAMSULOSIN HYDROCHLORIDE 0.4 MG: 0.4 CAPSULE ORAL at 08:30

## 2023-11-12 RX ADMIN — ACETAMINOPHEN 650 MG: 325 TABLET ORAL at 05:14

## 2023-11-12 RX ADMIN — VALPROIC ACID 250 MG: 250 SOLUTION ORAL at 09:29

## 2023-11-12 ASSESSMENT — COLUMBIA-SUICIDE SEVERITY RATING SCALE - C-SSRS
6. HAVE YOU EVER DONE ANYTHING, STARTED TO DO ANYTHING, OR PREPARED TO DO ANYTHING TO END YOUR LIFE?: NO
1. SINCE LAST CONTACT, HAVE YOU WISHED YOU WERE DEAD OR WISHED YOU COULD GO TO SLEEP AND NOT WAKE UP?: NO
2. HAVE YOU ACTUALLY HAD ANY THOUGHTS OF KILLING YOURSELF?: NO

## 2023-11-12 ASSESSMENT — PAIN - FUNCTIONAL ASSESSMENT
PAIN_FUNCTIONAL_ASSESSMENT: 0-10
PAIN_FUNCTIONAL_ASSESSMENT: 0-10

## 2023-11-12 ASSESSMENT — PAIN DESCRIPTION - DESCRIPTORS: DESCRIPTORS: THROBBING

## 2023-11-12 ASSESSMENT — PAIN SCALES - GENERAL
PAINLEVEL_OUTOF10: 0 - NO PAIN
PAINLEVEL_OUTOF10: 10 - WORST POSSIBLE PAIN
PAINLEVEL_OUTOF10: 6
PAINLEVEL_OUTOF10: 3
PAINLEVEL_OUTOF10: 3

## 2023-11-12 ASSESSMENT — PAIN DESCRIPTION - LOCATION: LOCATION: LEG

## 2023-11-12 ASSESSMENT — PAIN DESCRIPTION - ORIENTATION: ORIENTATION: LEFT
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
## 2023-11-12 NOTE — CARE PLAN
The patient's goals for the shift include patient arousable and following commands    The clinical goals for the shift include Patient will remsin free from falls or injury during this shift of [id number].

## 2023-11-12 NOTE — PROGRESS NOTES
.                                                                                                                                                                                                                                                                                             INTERNAL MEDICINE PROGRESS NOTE     BRIEF NARRATIVE      Espinoza Ross is a 67 y.o. male on day 4 of admission presenting with Unresponsive. On arrival, patient was unresponsive but breathing. Placed on NRB. Patient was given narcan 2mg IV without improvement. Patient was intubated due to inability to protect airway. Placed on propofol for sedation. Started on broad spectrum abx.  Patient is currently extubated and transferred out of ICU.  On 11/10/2020 while admitted [facility], patient became severely agitated and restless left his room and was laying down in the hallway.  He was given Haldol with minimal effect, psych was consulted and patient was transferred to MPU.     SUBJECTIVE     Patient seen and evaluated today, he was alert and awake oriented in nonacute distress.  Patient is less combative today, BP better controlled.  No other complaints    OBJECTIVE      Visit Vitals  /80 (BP Location: Right arm, Patient Position: Lying)   Pulse 60   Temp 36.4 °C (97.5 °F) (Temporal)   Resp 14        Intake/Output Summary (Last 24 hours) at 11/12/2023 1356  Last data filed at 11/12/2023 1300  Gross per 24 hour   Intake --   Output 250 ml   Net -250 ml         Physical Exam   Physical Exam: awake, not sedated, followed command, maex4, unable to communicate due to intubation   Eyes: R eye pupils +1, L eye pupil +4 sluggish   ENMT: mucous membranes moist, no apparent injury  Head/Neck: NC/AT  Respiratory/Thorax: clear upper lobes, diminished BB no wheezing or rales   Cardiovascular: RRR s1/s2 no s3/s4 no gmr no edema   Gastrointestinal: Nondistended, soft, non-tender, BS present x 4  : Palomino catheter in place draining clear  yellow urine  Musculoskeletal: 2/2 BUE and BLE   Extremities: normal extremities, no edema  Neurological: followed command, awake, alert, unable to stay level of orientation   Skin: Warm and dry, no lesions, no rashes    Current Meds   amLODIPine, 10 mg, oral, Daily  ARIPiprazole, 5 mg, oral, Daily  atorvastatin, 10 mg, oral, Nightly  carvedilol, 12.5 mg, oral, BID with meals  [MAR Hold] esomeprazole, 40 mg, nasoduodenal tube, Daily before breakfast  heparin (porcine), 5,000 Units, subcutaneous, q8h  insulin lispro, 0-5 Units, subcutaneous, q4h  lidocaine, 1 patch, transdermal, Daily  LORazepam, 1 mg, intramuscular, Once  melatonin, 5 mg, oral, Nightly  [MAR Hold] sennosides-docusate sodium, 1 tablet, oral, BID  [MAR Hold] tamsulosin, 0.4 mg, oral, Nightly  tamsulosin, 0.4 mg, oral, Daily  valproic acid, 250 mg, oral, q12h       PRN medications: [MAR Hold] acetaminophen, dextrose 10 % in water (D10W), dextrose, glucagon, haloperidol, hydrALAZINE, hydrOXYzine HCL, OLANZapine, oxygen, traZODone     LABS and IMAGING     WBC   Date Value Ref Range Status   11/10/2023 6.4 4.4 - 11.3 x10*3/uL Final     Hemoglobin   Date Value Ref Range Status   11/10/2023 10.3 (L) 13.5 - 17.5 g/dL Final     Hematocrit   Date Value Ref Range Status   11/10/2023 32.8 (L) 41.0 - 52.0 % Final     Bicarbonate   Date Value Ref Range Status   11/10/2023 25 21 - 32 mmol/L Final   11/09/2023 26 21 - 32 mmol/L Final     Creatinine   Date Value Ref Range Status   11/10/2023 1.04 0.50 - 1.30 mg/dL Final   11/09/2023 1.70 (H) 0.50 - 1.30 mg/dL Final     Calcium   Date Value Ref Range Status   11/10/2023 8.7 8.6 - 10.6 mg/dL Final   11/09/2023 8.5 (L) 8.6 - 10.6 mg/dL Final       Transthoracic Echo (TTE) Complete     [facility], [address] [place] [address]                 Tel [phone] and Fax [phone]    TRANSTHORACIC ECHOCARDIOGRAM REPORT       Patient Name:      ALMA German Physician:    [id number]  Beena Hernandez MD  Study Date:        11/9/2023            Ordering Provider:    [id number] KAVEH CATALAN  MRN/PID:           [id number]             Fellow:  Accession#:        [id number]         Nurse:  Date of Birth/Age: 1955 / 67      Sonographer:          Andra herrera                                      RDVICK  Gender:            M                    Additional Staff:  Height:            172.72 cm            Admit Date:  Weight:            68.95 kg             Admission Status:     Inpatient -                                                                Routine  BSA:               1.82 m2              Encounter#:           [id number]                                          Department Location:  [place] [address] MICU  Blood Pressure: 167 /77 mmHg    Study Type:    TRANSTHORACIC ECHO (TTE) COMPLETE  Diagnosis/ICD: Bifascicular block-I45.2  Indication:    BBB, Found Unresponsive  CPT Code:      Echo Limited-94557; Doppler Limited-20699; Color Doppler-39578    Patient History:  Pertinent History: BBB, found unresponsive.    Study Detail: The following Echo studies were performed: Doppler, M-Mode, 2D and                color flow. Technically challenging study due to the patient's                lack of cooperation, prominent lung artifact and Pt. terminated                test mid exam. The patient is intubated. Unable to obtain apical                2-chamber, apical long, subcostal and suprasternal notch view.       PHYSICIAN INTERPRETATION:  Left Ventricle: The left ventricular systolic function is normal. The left ventricular cavity size is normal. Left ventricular diastolic filling was not assessed. Calcified papillary muscle.  Left Atrium: The left atrium is normal in size.  Right Ventricle: The right ventricle was not assessed. Right ventricular systolic  function not assessed.  Right Atrium: The right atrium was not well visualized.  Aortic Valve: The aortic valve is probably trileaflet. There is mild aortic valve cusp calcification. There is no evidence of aortic valve regurgitation.  Mitral Valve: The mitral valve is mildly thickened. There is trace mitral valve regurgitation.  Tricuspid Valve: The tricuspid valve was not well visualized. Tricuspid regurgitation was not assessed.  Pulmonic Valve: The pulmonic valve is not well visualized. The pulmonic valve regurgitation was not well visualized.  Pericardium: A pericardial effusion was not well visualized.  Aorta: The aortic root is normal.  In comparison to the previous echocardiogram(s): Although previous studies are available for review, their comparison with the current echocardiogram is clinically irrelevant. Compared with study from 11/18/2021,.       CONCLUSIONS:   1. Left ventricular systolic function is normal.   2. Test is early terminated per patient request.    QUANTITATIVE DATA SUMMARY:  2D MEASUREMENTS:                           Normal Ranges:  Ao Root d:     3.20 cm   (2.0-3.7cm)  LAs:           3.20 cm   (2.7-4.0cm)  IVSd:          0.90 cm   (0.6-1.1cm)  LVPWd:         0.80 cm   (0.6-1.1cm)  LVIDd:         4.10 cm   (3.9-5.9cm)  LVIDs:         3.50 cm  LV Mass Index: 58.1 g/m2  LV % FS        14.6 %    AORTIC VALVE:                         Normal Ranges:  LVOT Diameter: 2.00 cm (1.8-2.4cm)    PULMONIC VALVE:                       Normal Ranges:  PV Max Nils: 1.0 m/s  (0.6-0.9m/s)  PV Max PG:  3.8 mmHg       [id number] Beena Hernandez MD  Electronically signed on 11/9/2023 at 2:30:12 PM       ** Final **  EEG  IMPRESSION    Impression    This vEEG is indicative of a severe diffuse encephalopathy. No epileptiform discharges or lateralizing signs are seen.    A full report will be scanned into the patient's chart at a later time.    This report has been interpreted and electronically signed by  XR chest 1  view, XR abdomen 1 view  Narrative: Interpreted By:  Kyle Avila  and Paola Harrlel   STUDY:  XR CHEST 1 VIEW; XR ABDOMEN 1 VIEW;  11/8/2023 7:55 pm; 11/8/2023  7:56 pm      INDICATION:  Signs/Symptoms:ET adjusted; Signs/Symptoms:NG placement confirmation.      COMPARISON:  Chest radiograph 11/08/2023      ACCESSION NUMBER(S):  [id number]; [id number]      ORDERING CLINICIAN:  ALY CARPENTER      FINDINGS:  AP radiograph of the chest and abdomen were provided.      Tip of the endotracheal tube projects 4.0 cm above the martín,  previously 1.3 cm. Tip of the esophageal temperature probe projects  over the mid esophagus, new from prior. Tip of the enteric tube  projects over the proximal stomach.      CARDIOMEDIASTINAL SILHOUETTE:  Cardiomediastinal silhouette is normal in size and configuration.      LUNGS:  Lungs are clear.      ABDOMEN:  Nonobstructive bowel gas pattern. No evidence of pneumoperitoneum on  this limited supine radiograph. Rounded calcific density overlying  the right upper quadrant, likely relates to gallbladder calcification.      BONES:  No acute osseous changes.      Impression: 1. Interval retraction of the endotracheal tube, now projecting 4.0  cm above the martín.  2. No evidence of acute cardiopulmonary process.  3. Nonobstructive bowel gas pattern.  4. Calcific density overlying the right upper quadrant, likely  relates to gallbladder calcifications. Correlate with right upper  quadrant ultrasound as clinically indicated.      I personally reviewed the images/study and I agree with the findings  as stated by Resident Julio Cesar Guevara. This study was interpreted at  [facility], [place], [place].      MACRO:  None      Signed by: Kyle Avila 11/9/2023 8:09 AM  Dictation workstation:   [id number]  ECG 12 lead  Please see ED Provider Note for formal interpretation  Confirmed by Mayra Napier ([id number]) on 11/9/2023 1:44:52 AM       ASSESSMENT / PLANS       #Acute encephalopathy  #hx of Paranoid schizophrenia. Polysubstance use disorder in the past.   #Dementia.  Presented with Acute encephalopathy: Ddx medication vs metabolic vs arrhythmia vs seizures   - Neurology consulted, VEEG done, acute metabolic encephalopathy, no seizures   - PSYCH consult to advice with resuming home meds   - Home med: Melatonin, aripiprazole 30 mg daily, divalproex sodium 250 mg BID, Donepezil HCL 10 mg daily, gabapentin 200 mb BID, trazodone 25 mg at HS, trileptal 300 mg BID, haldol 1 mg prn for agitation, Hydroxyzine hcl 50 mg prn for anxiety.   -PT/OT  -11/08 CT head showed: no new acute findings.   -Per psych recommendation:  - Recommend restarting Abilify at 5mg PO daily (home dose 30mg)  - Continue with Depakene 250mg PO BID  - Continue with Melatonin 3mg PO at night  - Patient does not meet criteria for inpatient psychiatric admission  - Patient does not require 1:1 observation  -Continue nicotine replacement therapy  PRN:  - Continue with Olanzapine 2.5mg PO/IM (if unable to take PO) Q6H PRN agitation  - Discontinue PRN Haldol  - Continue with Trazodone 25mg PO PRN insomnia     #Acute hypoxic respiratory failure   presented to the ED with GCS<8, intubated for airway protection  Now extubated 11/09 at noon   - wean o2 per sat>92%   - Continue to monitor respiratory status    #Hypertensive emergency, elevated BP associated with AMS and ANNETTE  -BP better controlled  -Patient home antihypertensive regimen: Carvedilol 3.125 mg twice daily, lisinopril 40 mg daily.  -We will increase the dose of carvedilol to 12.5 mg twice daily today, will continue to hold lisinopril provided ANNETTE.  -We will add amlodipine 10 mg daily for optimal BP control  -IV hydralazine 10 mg every 4 hours for SBP> 170    # hx of HTN and HFpEF 55%.   Presented with bradycardia with HR 40s (resolved), noted new RBBB, no ST changes, negative troponin, normal lactate   - order ECG showed QTC>512 ms , RBBB, L axix  devation. No ST changes   - Holding home med (Atorvastatin 10 mg daily, Carvedilol 3.125 mg BD, furosemide 20 mg daily, lisinopril 40 mg daily  -Continue home meds, will hold lisinopril and Lasix provided ANNETTE.  Continue Coreg  -New echo 11/18/2021 ECHO LVSF normal, EF 55%. Impaired relaxed pattern LVD filling.       hx of GERD, c/o dysphagia   diet (Noted change in diet texture at the facility from regular to mechanical soft)   - consult SLP to evaluate swallow, no dentition   - Bowel Regimen: Consistent carb  - Daily RFP   - PPI  Home med (omprazole 20 mg daily, vitamin B6, vitamin D,      # hx of BPH.   Presented with non oliguric ANNETTE, likely prerenal causes (reported pt with poor oral intake prior days to this admission and pt taking furosemide at the facility)  - order urine electrolyte to evaluate ANNETTE causes   - Daily wt and Strict I&Os  - Daily RFP  - Holding home med (Tamsulonin 0.4 mg daily for BPH)   - order Ammonia level   - Holding home lactulose 10 mg TID (per records takes it for constipation)      Anemia of chronic disease   -Daily CBC  - Holding home Ferrous sulfate 325 mg BID      hx of IDDM II (HgbA1C 7.8% on 06/2023)   - cont ISS  - consider restarting long acting insulin   - Home med (lantus 32 units at HS and ISS)   - Hypoglycemia protocol      P: heparin subcutaneous, PPI   F prn to keep net even   E replete   N NPO   A: ETT/Candelario ONOFRE 11/08      CODE status FULL CODE   Yunior Kelly  Contact Number(s): [phone]  Contact Relationship: guardian     Dispo: not medically ready for discharge.     Xochitl Reyna MD

## 2023-11-12 NOTE — NURSING NOTE
Pt in bed resting, refused blood draw. States he is tired of being stuck. Pt educated and continued to refuse

## 2023-11-13 LAB
GLUCOSE BLD MANUAL STRIP-MCNC: 134 MG/DL (ref 74–99)
GLUCOSE BLD MANUAL STRIP-MCNC: 207 MG/DL (ref 74–99)
GLUCOSE BLD MANUAL STRIP-MCNC: 214 MG/DL (ref 74–99)

## 2023-11-13 PROCEDURE — 96372 THER/PROPH/DIAG INJ SC/IM: CPT | Performed by: NURSE PRACTITIONER

## 2023-11-13 PROCEDURE — 2500000004 HC RX 250 GENERAL PHARMACY W/ HCPCS (ALT 636 FOR OP/ED): Performed by: INTERNAL MEDICINE

## 2023-11-13 PROCEDURE — 82947 ASSAY GLUCOSE BLOOD QUANT: CPT

## 2023-11-13 PROCEDURE — 2500000004 HC RX 250 GENERAL PHARMACY W/ HCPCS (ALT 636 FOR OP/ED): Performed by: NURSE PRACTITIONER

## 2023-11-13 PROCEDURE — 2500000005 HC RX 250 GENERAL PHARMACY W/O HCPCS: Performed by: STUDENT IN AN ORGANIZED HEALTH CARE EDUCATION/TRAINING PROGRAM

## 2023-11-13 PROCEDURE — 1100000001 HC PRIVATE ROOM DAILY

## 2023-11-13 PROCEDURE — 99232 SBSQ HOSP IP/OBS MODERATE 35: CPT | Performed by: INTERNAL MEDICINE

## 2023-11-13 PROCEDURE — 99232 SBSQ HOSP IP/OBS MODERATE 35: CPT | Performed by: PSYCHIATRY & NEUROLOGY

## 2023-11-13 PROCEDURE — 2500000001 HC RX 250 WO HCPCS SELF ADMINISTERED DRUGS (ALT 637 FOR MEDICARE OP): Performed by: INTERNAL MEDICINE

## 2023-11-13 PROCEDURE — 2500000004 HC RX 250 GENERAL PHARMACY W/ HCPCS (ALT 636 FOR OP/ED): Performed by: STUDENT IN AN ORGANIZED HEALTH CARE EDUCATION/TRAINING PROGRAM

## 2023-11-13 PROCEDURE — 2500000001 HC RX 250 WO HCPCS SELF ADMINISTERED DRUGS (ALT 637 FOR MEDICARE OP): Performed by: STUDENT IN AN ORGANIZED HEALTH CARE EDUCATION/TRAINING PROGRAM

## 2023-11-13 PROCEDURE — 2500000001 HC RX 250 WO HCPCS SELF ADMINISTERED DRUGS (ALT 637 FOR MEDICARE OP): Performed by: NURSE PRACTITIONER

## 2023-11-13 RX ORDER — PANTOPRAZOLE SODIUM 40 MG/1
40 TABLET, DELAYED RELEASE ORAL
Status: DISCONTINUED | OUTPATIENT
Start: 2023-11-14 | End: 2023-11-14 | Stop reason: HOSPADM

## 2023-11-13 RX ORDER — ACETAMINOPHEN 325 MG/1
975 TABLET ORAL EVERY 8 HOURS PRN
Status: DISCONTINUED | OUTPATIENT
Start: 2023-11-13 | End: 2023-11-14 | Stop reason: HOSPADM

## 2023-11-13 RX ADMIN — TAMSULOSIN HYDROCHLORIDE 0.4 MG: 0.4 CAPSULE ORAL at 20:21

## 2023-11-13 RX ADMIN — ARIPIPRAZOLE 5 MG: 5 TABLET ORAL at 08:20

## 2023-11-13 RX ADMIN — VALPROIC ACID 250 MG: 250 SOLUTION ORAL at 20:30

## 2023-11-13 RX ADMIN — TAMSULOSIN HYDROCHLORIDE 0.4 MG: 0.4 CAPSULE ORAL at 08:20

## 2023-11-13 RX ADMIN — LIDOCAINE 1 PATCH: 4 PATCH TOPICAL at 08:30

## 2023-11-13 RX ADMIN — INSULIN LISPRO 2 UNITS: 100 INJECTION, SOLUTION INTRAVENOUS; SUBCUTANEOUS at 20:21

## 2023-11-13 RX ADMIN — ACETAMINOPHEN 975 MG: 325 TABLET ORAL at 20:31

## 2023-11-13 RX ADMIN — CARVEDILOL 12.5 MG: 12.5 TABLET, FILM COATED ORAL at 17:56

## 2023-11-13 RX ADMIN — ATORVASTATIN CALCIUM 10 MG: 20 TABLET, FILM COATED ORAL at 20:21

## 2023-11-13 RX ADMIN — HEPARIN SODIUM 5000 UNITS: 5000 INJECTION INTRAVENOUS; SUBCUTANEOUS at 08:30

## 2023-11-13 RX ADMIN — CARVEDILOL 12.5 MG: 12.5 TABLET, FILM COATED ORAL at 08:20

## 2023-11-13 RX ADMIN — Medication 5 MG: at 20:20

## 2023-11-13 RX ADMIN — ACETAMINOPHEN 975 MG: 325 TABLET ORAL at 09:31

## 2023-11-13 RX ADMIN — HEPARIN SODIUM 5000 UNITS: 5000 INJECTION INTRAVENOUS; SUBCUTANEOUS at 01:06

## 2023-11-13 RX ADMIN — HEPARIN SODIUM 5000 UNITS: 5000 INJECTION INTRAVENOUS; SUBCUTANEOUS at 17:56

## 2023-11-13 RX ADMIN — SENNOSIDES AND DOCUSATE SODIUM 1 TABLET: 8.6; 5 TABLET ORAL at 20:21

## 2023-11-13 RX ADMIN — AMLODIPINE BESYLATE 10 MG: 10 TABLET ORAL at 08:20

## 2023-11-13 ASSESSMENT — COGNITIVE AND FUNCTIONAL STATUS - GENERAL
PERSONAL GROOMING: A LOT
STANDING UP FROM CHAIR USING ARMS: A LOT
WALKING IN HOSPITAL ROOM: A LOT
PERSONAL GROOMING: A LOT
MOVING TO AND FROM BED TO CHAIR: A LOT
DRESSING REGULAR UPPER BODY CLOTHING: A LITTLE
TOILETING: A LOT
EATING MEALS: A LITTLE
HELP NEEDED FOR BATHING: A LOT
CLIMB 3 TO 5 STEPS WITH RAILING: A LOT
MOBILITY SCORE: 14
DRESSING REGULAR LOWER BODY CLOTHING: A LITTLE
HELP NEEDED FOR BATHING: A LOT
CLIMB 3 TO 5 STEPS WITH RAILING: TOTAL
WALKING IN HOSPITAL ROOM: A LOT
TURNING FROM BACK TO SIDE WHILE IN FLAT BAD: A LITTLE
DRESSING REGULAR LOWER BODY CLOTHING: A LOT
MOVING TO AND FROM BED TO CHAIR: A LOT
DAILY ACTIVITIY SCORE: 16
MOBILITY SCORE: 14
STANDING UP FROM CHAIR USING ARMS: A LOT
MOVING FROM LYING ON BACK TO SITTING ON SIDE OF FLAT BED WITH BEDRAILS: A LITTLE
DRESSING REGULAR UPPER BODY CLOTHING: A LOT
DAILY ACTIVITIY SCORE: 14
TOILETING: A LITTLE
TURNING FROM BACK TO SIDE WHILE IN FLAT BAD: A LITTLE

## 2023-11-13 ASSESSMENT — PAIN DESCRIPTION - ORIENTATION: ORIENTATION: LEFT

## 2023-11-13 ASSESSMENT — PAIN - FUNCTIONAL ASSESSMENT
PAIN_FUNCTIONAL_ASSESSMENT: 0-10

## 2023-11-13 ASSESSMENT — PAIN DESCRIPTION - LOCATION: LOCATION: SHOULDER

## 2023-11-13 ASSESSMENT — ACTIVITIES OF DAILY LIVING (ADL): LACK_OF_TRANSPORTATION: NO

## 2023-11-13 ASSESSMENT — PAIN DESCRIPTION - DESCRIPTORS: DESCRIPTORS: ACHING

## 2023-11-13 ASSESSMENT — PAIN SCALES - GENERAL
PAINLEVEL_OUTOF10: 9
PAINLEVEL_OUTOF10: 4
PAINLEVEL_OUTOF10: 3
PAINLEVEL_OUTOF10: 9

## 2023-11-13 NOTE — PROGRESS NOTES
"Espinoza Ross is a 67 y.o. male on day 5 of admission presenting with Unresponsive.      Subjective   Pt seen today, he is lying naked on bed but agreeable to be covered with blanket. Upset stating that he was accused of throwing urinal but he did not and now he feels like throwing the table. Able to verbally redirect. He asks if he will be going to a new facility. Denies SI/HI, denies AVH.     ROS reports pain in shoulder and asks for heat pack, no SOB    Huddle updates: pt has been irritable this AM. Per guardian, pt to return to [facility]       Objective     Last Recorded Vitals  Blood pressure 148/72, pulse 70, temperature 36.3 °C (97.3 °F), resp. rate 18, height 1.727 m (5' 8\"), weight 72.6 kg (160 lb 0.9 oz), SpO2 100 %.    Review of Systems    Psychiatric ROS - Adult  Per HPI    Physical Exam      Mental Status Exam  General: M lying naked in bed   Appearance: unkempt  Attitude: irritable  Behavior: fair eye contact  Motor Activity: no rigidity, no tremor  Speech: regular rate/volume, no dysarthria  Mood: \"mad\"  Affect: fairly constricted, mildly irritable  Thought Process: on topic, no ROSY, concrete  Thought Content: some issues with staff but not clear paranoia, no SI/HI  Thought Perception: not RTIS, denies AVH  Cognition: awake, oriented to person, place, time, fair to situation  Insight: poor  Judgement: poor      Relevant Results  Scheduled medications  amLODIPine, 10 mg, oral, Daily  ARIPiprazole, 5 mg, oral, Daily  atorvastatin, 10 mg, oral, Nightly  carvedilol, 12.5 mg, oral, BID with meals  [MAR Hold] esomeprazole, 40 mg, nasoduodenal tube, Daily before breakfast  heparin (porcine), 5,000 Units, subcutaneous, q8h  insulin lispro, 0-5 Units, subcutaneous, q4h  lidocaine, 1 patch, transdermal, Daily  LORazepam, 1 mg, intramuscular, Once  melatonin, 5 mg, oral, Nightly  nicotine, 1 patch, transdermal, Daily  [MAR Hold] sennosides-docusate sodium, 1 tablet, oral, BID  [MAR Hold] tamsulosin, " 0.4 mg, oral, Nightly  tamsulosin, 0.4 mg, oral, Daily  valproic acid, 250 mg, oral, q12h      Continuous medications     PRN medications  PRN medications: acetaminophen, dextrose 10 % in water (D10W), dextrose, glucagon, haloperidol, hydrALAZINE, hydrOXYzine HCL, OLANZapine, oxygen, traZODone    Results for orders placed or performed during the hospital encounter of 11/08/23 (from the past 24 hour(s))   POCT GLUCOSE   Result Value Ref Range    POCT Glucose 179 (H) 74 - 99 mg/dL   POCT GLUCOSE   Result Value Ref Range    POCT Glucose 185 (H) 74 - 99 mg/dL   POCT GLUCOSE   Result Value Ref Range    POCT Glucose 134 (H) 74 - 99 mg/dL     Encounter Date: 11/08/23   ECG 12 lead   Result Value    Ventricular Rate 94    Atrial Rate 94    IA Interval 158    QRS Duration 124    QT Interval 420    QTC Calculation(Bazett) 525    P Axis 85    R Axis -68    T Axis 54    QRS Count 16    Q Onset 224    P Onset 145    P Offset 197    T Offset 434    QTC Fredericia 487    Narrative    Please see ED Provider Note for formal interpretation  Confirmed by Mayra Napier ([id number]) on 11/9/2023 1:44:52 AM            PSYCHIATRIC RISK ASSESSMENT  Homicide: low-moderate, Given pt's history of aggression, legal,  and weapons training history   Suicide: low-moderate, per two prior remote suicide attempts/gestures      Medication Consent  N/A - Consult Service     Assessment:  68y/o M Duarte w/PMSHx of: Dementia, Paranoid Schizophrenia - Paranoid Type, cPTSD, Polysubstance Abuse (Etoh, Tobacco, Cannabis, Cocaine, and Opioids), Medication-Induced Tardive Dyskinesia, TBI w/+/- LOC, Insomnia, Bradycardia, Unsteady Gait, GERD, Pancreatitis, Hepatitis  C+(unclear if treated and viral load), HTN, BPH, Penile Implant, and he has a Court-Appointed Legal-Guardian (Mr. Yunior Hare), who presented to [facility] ED by EMS from his SNF ([facility]) on 11/8/2023, after being found down and unresponsive at his nursing home facility. Medical course  has been complicated by intubation and MICU stay; extubated 11/9. Psychiatry consulted for medication management.     Update 11/13: Pt is somewhat irritable today, Qtc remains prolonged. Will continue current regimen and monitor to consider further medication adjustment     Impression:  Delirium, Multifactorial, Acute, with Mixed Activity -- resolving  Neurocognitive Impairment (Dementia, Unspecified Type), per hx  Paranoid Schizophrenia - Paranoid Type, per hx  cPTSD, per hx  Medication-Induced Tardive Dyskinesia, per hx  Polysubstance use disorder (alcohol, tobacco, cannabis, stimulant, opioid) per hx     Recommendations:  Safety/Monitoring:  Patient does not meet criteria for inpatient psychiatric admission  Patient does not require 1:1 observation, given enhanced safety features. Defer to primary team.  Daily interdisciplinary safety and planning huddle with Psychiatry  Video monitoring as with all patients on the MPU  Psychiatry will follow patient daily while on the MPU     Medications:  - CONTINUE Abilify at 5mg PO daily (home dose 30mg)  - Continue with Depakene 250mg PO BID  - Continue with Melatonin 3mg PO at night     Note: Please refer to Psychiatry Consult documentation (11/09) for full list of patient's home medications and dosages.     PRN:  - Continue with Olanzapine 2.5mg PO/IM (if unable to take PO) Q6H PRN agitation  - Discontinue PRN Haldol  - Continue with Trazodone 25mg PO PRN insomnia     Recommendations relayed to medical provider.     Considerations:  Music, , and recreational therapy as indicated     Disposition/Discharge Planning:  SW following, appreciate assistance. Likely return to [facility]  --Court-Appointed Legal-Guardian since 2012, Mr. Yunior Hare.            Ronel Downing MD   Psychiatry Attending

## 2023-11-13 NOTE — CARE PLAN
The patient's goals for the shift include patient arousable and following commands    The clinical goals for the shift include Patient will remain free from falls or injury during this shift of [id number].    Over the shift, the patient did not make progress toward the following goals. Barriers to progression include . Recommendations to address these barriers include .

## 2023-11-13 NOTE — PROGRESS NOTES
Espinoza Ross is a 67 y.o. male on day 5 of admission presenting with Unresponsive.    Subjective   Received update from Dr. Ramirez that patent may be ready for discharge tomorrow; currently awaiting SLP evaluation. Update was sent to [facility] in [facility] and attached clinicals.    -Corin MCKINNEY MA, LSW  [phone] or [facility] Secure Chat  Care Transitions

## 2023-11-13 NOTE — PROGRESS NOTES
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   -Presented with bradycardia with HR 40s (resolved), noted new RBBB, no ST changes, negative troponin, normal lactate.  Likely was secondary to respiratory issues and opiate overdose.  -order ECG showed QTC>512 ms , RBBB, L axix devation. No ST changes   -Continue home meds, will hold lisinopril and Lasix provided ANNETTE.  Continue Coreg  -New echo 11/18/2021 ECHO LVSF normal, EF 55%. Impaired relaxed pattern LVD filling.      Acute kidney injury  -Creatinine baseline 0.8.  Patient presented with creatinine of 2.23.  Last creatinine resolved to 1.04.  -Acute kidney injury appears resolved now.  -We will monitor to see when can resume oral Lasix.    Hx of GERD, ?dysphagia   -diet (Noted change in diet texture at the facility from regular to mechanical soft)   -Speech eval pending although per bedside nursing he has been tolerating full liquids without any issues.  No signs of coughing or choking.  -PPI.  -Home med (omprazole 20 mg daily, vitamin B6, vitamin D,      Hx of BPH.  -Continue home Flomax 0.4 mg daily.     Anemia of chronic  disease   -Baseline hemoglobin 9-10.  Last hemoglobin 10.3.  Monitor as needed.  -Holding home Ferrous sulfate 325 mg BID      hx of IDDM II (HgbA1C 7.8% on 06/2023)   -Glucose appears to be better controlled last 24 hours.  -cont SSI..  -Currently holding Home med lantus 32 units at HS   -Hypoglycemia protocol         CODE status FULL CODE   Yunior Kelly  Contact Number(s): [phone]  Contact Relationship: guardian     Dispo: Monitoring ability to take in soft mechanical diet.  Potential discharge back to long-term nursing home tomorrow.      Emiliana Ramirez MD

## 2023-11-13 NOTE — PROGRESS NOTES
Recreation Therapy Note    Therapy Session  Visit Type: New visit  Session Start Time: 1625  Session End Time: 1645  Intervention Delivery: In-person  Conflict of Service: None  Number of family members present: 0  Family Present for Session: None  Family Participation: None  Number of staff members present: 2    Pre-assessment  Unable to Assess Reason: Outcomes not applicable  Mood/Affect: Appropriate, Cooperative  Verbalized Emotional State:  (none verbalized)    Treatment  Areas of Focus: Coping, Socialization, Normalization  Co-Treatment:  (none)  Interruption: No  Patient Fell Asleep at End of Session: No    Post-assessment  Unable to Assess Reason: Outcomes not applicable  Mood/Affect: Appropriate, Calm, Cooperative  Verbalized Emotional State:  (happiness about the Authernative game outcome)  Continue Visiting: Yes  Total Session Time (min): 20 minutes    Narrative  Assessment Detail: Patient was in bed and calm upon apprach.  Plan: To encourage the exploration of safe and effective positive leisure coping skills to manage situational stressors.  Intervention: Patient stated that he couldn't see well enough to engage in an activity but did say he enjoyed gospel music.  Played patient a playlist of gospel music that he sang along to.  Evaluation: Patient was appropriate with behavior in good control throughout the encounter.  Garbled speech and very difficult to understand.  Seemed to enjoy the music.  Follow-up: Will continue to encourage positive leisure coping skills exploration.  Patient Comments:  (n/a)

## 2023-11-13 NOTE — CARE PLAN
The patient's goals for the shift include patient arousable and following commands    The clinical goals for the shift include Patient will remain free from falls or injury during this shift of 3719-4144.    Over the shift, the patient did not make progress toward the following goals. Barriers to progression include . Recommendations to address these barriers include .

## 2023-11-14 VITALS
TEMPERATURE: 97.5 F | RESPIRATION RATE: 16 BRPM | HEIGHT: 68 IN | WEIGHT: 160.05 LBS | SYSTOLIC BLOOD PRESSURE: 167 MMHG | OXYGEN SATURATION: 100 % | DIASTOLIC BLOOD PRESSURE: 84 MMHG | HEART RATE: 73 BPM | BODY MASS INDEX: 24.26 KG/M2

## 2023-11-14 PROBLEM — R41.89 UNRESPONSIVE: Status: RESOLVED | Noted: 2023-11-08 | Resolved: 2023-11-14

## 2023-11-14 LAB — GLUCOSE BLD MANUAL STRIP-MCNC: 169 MG/DL (ref 74–99)

## 2023-11-14 PROCEDURE — 82947 ASSAY GLUCOSE BLOOD QUANT: CPT

## 2023-11-14 PROCEDURE — 2500000001 HC RX 250 WO HCPCS SELF ADMINISTERED DRUGS (ALT 637 FOR MEDICARE OP): Performed by: INTERNAL MEDICINE

## 2023-11-14 PROCEDURE — 2500000002 HC RX 250 W HCPCS SELF ADMINISTERED DRUGS (ALT 637 FOR MEDICARE OP, ALT 636 FOR OP/ED): Performed by: STUDENT IN AN ORGANIZED HEALTH CARE EDUCATION/TRAINING PROGRAM

## 2023-11-14 PROCEDURE — S4991 NICOTINE PATCH NONLEGEND: HCPCS | Performed by: STUDENT IN AN ORGANIZED HEALTH CARE EDUCATION/TRAINING PROGRAM

## 2023-11-14 PROCEDURE — 96372 THER/PROPH/DIAG INJ SC/IM: CPT | Performed by: NURSE PRACTITIONER

## 2023-11-14 PROCEDURE — 2500000004 HC RX 250 GENERAL PHARMACY W/ HCPCS (ALT 636 FOR OP/ED): Performed by: NURSE PRACTITIONER

## 2023-11-14 PROCEDURE — 99239 HOSP IP/OBS DSCHRG MGMT >30: CPT | Performed by: INTERNAL MEDICINE

## 2023-11-14 PROCEDURE — 2500000005 HC RX 250 GENERAL PHARMACY W/O HCPCS: Performed by: STUDENT IN AN ORGANIZED HEALTH CARE EDUCATION/TRAINING PROGRAM

## 2023-11-14 PROCEDURE — 2500000004 HC RX 250 GENERAL PHARMACY W/ HCPCS (ALT 636 FOR OP/ED): Performed by: INTERNAL MEDICINE

## 2023-11-14 PROCEDURE — 2500000001 HC RX 250 WO HCPCS SELF ADMINISTERED DRUGS (ALT 637 FOR MEDICARE OP): Performed by: STUDENT IN AN ORGANIZED HEALTH CARE EDUCATION/TRAINING PROGRAM

## 2023-11-14 RX ORDER — AMLODIPINE BESYLATE 10 MG/1
10 TABLET ORAL DAILY
Start: 2023-11-14

## 2023-11-14 RX ORDER — GABAPENTIN 100 MG/1
200 CAPSULE ORAL 2 TIMES DAILY
Start: 2023-11-14

## 2023-11-14 RX ORDER — CARVEDILOL 3.12 MG/1
3.12 TABLET ORAL
Start: 2023-11-14

## 2023-11-14 RX ORDER — ARIPIPRAZOLE 5 MG/1
5 TABLET ORAL DAILY
Start: 2023-11-14

## 2023-11-14 RX ORDER — LISINOPRIL 40 MG/1
40 TABLET ORAL DAILY
Status: DISCONTINUED | OUTPATIENT
Start: 2023-11-14 | End: 2023-11-14 | Stop reason: HOSPADM

## 2023-11-14 RX ORDER — TALC
3 POWDER (GRAM) TOPICAL NIGHTLY
Status: DISCONTINUED | OUTPATIENT
Start: 2023-11-14 | End: 2023-11-14 | Stop reason: HOSPADM

## 2023-11-14 RX ORDER — AMOXICILLIN 250 MG
1 CAPSULE ORAL 2 TIMES DAILY
Start: 2023-11-14

## 2023-11-14 RX ORDER — INSULIN GLARGINE 100 [IU]/ML
5 INJECTION, SOLUTION SUBCUTANEOUS NIGHTLY
Start: 2023-11-14

## 2023-11-14 RX ORDER — OLANZAPINE 2.5 MG/1
2.5 TABLET ORAL EVERY 6 HOURS PRN
Start: 2023-11-14

## 2023-11-14 RX ORDER — CARVEDILOL 6.25 MG/1
6.25 TABLET ORAL
Status: DISCONTINUED | OUTPATIENT
Start: 2023-11-14 | End: 2023-11-14 | Stop reason: HOSPADM

## 2023-11-14 RX ADMIN — INSULIN LISPRO 1 UNITS: 100 INJECTION, SOLUTION INTRAVENOUS; SUBCUTANEOUS at 09:15

## 2023-11-14 RX ADMIN — LIDOCAINE 1 PATCH: 4 PATCH TOPICAL at 09:12

## 2023-11-14 RX ADMIN — HEPARIN SODIUM 5000 UNITS: 5000 INJECTION INTRAVENOUS; SUBCUTANEOUS at 09:12

## 2023-11-14 RX ADMIN — ARIPIPRAZOLE 5 MG: 5 TABLET ORAL at 09:12

## 2023-11-14 RX ADMIN — PANTOPRAZOLE SODIUM 40 MG: 40 TABLET, DELAYED RELEASE ORAL at 09:11

## 2023-11-14 RX ADMIN — NICOTINE 1 PATCH: 14 PATCH, EXTENDED RELEASE TRANSDERMAL at 09:14

## 2023-11-14 RX ADMIN — AMLODIPINE BESYLATE 10 MG: 10 TABLET ORAL at 09:12

## 2023-11-14 RX ADMIN — SENNOSIDES AND DOCUSATE SODIUM 1 TABLET: 8.6; 5 TABLET ORAL at 09:11

## 2023-11-14 RX ADMIN — ACETAMINOPHEN 975 MG: 325 TABLET ORAL at 09:25

## 2023-11-14 RX ADMIN — VALPROIC ACID 250 MG: 250 SOLUTION ORAL at 09:11

## 2023-11-14 RX ADMIN — HYDROXYZINE HYDROCHLORIDE 50 MG: 25 TABLET, FILM COATED ORAL at 02:39

## 2023-11-14 ASSESSMENT — PAIN - FUNCTIONAL ASSESSMENT
PAIN_FUNCTIONAL_ASSESSMENT: 0-10
PAIN_FUNCTIONAL_ASSESSMENT: 0-10

## 2023-11-14 ASSESSMENT — PAIN SCALES - GENERAL
PAINLEVEL_OUTOF10: 7
PAINLEVEL_OUTOF10: 7

## 2023-11-14 NOTE — CARE PLAN
The patient's goals for the shift include pt. glucose level will be within mikie range.    The clinical goals for the shift include pt. will be safe from injury

## 2023-11-14 NOTE — DISCHARGE SUMMARY
Discharge Diagnosis  Unresponsive/acute encephalopathy, suspect secondary to fentanyl in his system  Hypertensive emergency  Acute hypoxic respiratory failure due to mentation changes  Acute kidney injury    Issues Requiring Follow-Up  Monitor blood pressure.  Advised patient to stop using illegal drugs.    Test Results Pending At Discharge  None.    Hospital Course  Espinoza Ross is a 67 y.o. male presenting from long-term nursing home with unresponsiveness.  On arrival, patient was unresponsive but breathing. Placed on NRB. Patient was given narcan 2mg IV without improvement. Patient was intubated due to inability to protect airway. Placed on propofol for sedation. Started on broad spectrum abx.  Admitted to the MICU.  Urine tox screen positive for fentanyl.  Patient improved, extubated, then transferred to the floor. On 11/10/2020 while admitted to [facility], patient became severely agitated and restless left his room and was laying down in the hallway.  He was given Haldol with minimal effect, psych was consulted and patient was transferred to MPU.  Patient was restarted on some of his psychiatric medications although Abilify was decreased to 5 mg instead of normal 30 mg.  Mentation improved over time.  On the day of discharge patient is alert and oriented x3.  Patient also had noted hypertensive emergency on admission.  Medication compliance unknown.  Patient was also bradycardic but that was likely related to respiratory issues.  Bradycardia resolved.  Lisinopril held initially due to ANNETTE but then resumed.  Norvasc added to blood pressure regimen.  ANNETTE resolved.  Patient's diet advanced slowly back to baseline mechanical soft diet with thin liquids.  Patient tolerating well per nursing.  In discussion with care coordinator patient is a long-term nursing home at [facility] and his bed is on hold for him.  Patient is stable and can be discharged back to long-term nursing home.  Patient advised to  stop using illegal drugs and to be compliant with prescribed medications.        Assessment/Plan   Acute encephalopathy  Hx of Paranoid schizophrenia.   Polysubstance use disorder in the past with fentanyl in urine this admission  Dementia  -Presented with acute encephalopathy, suspected to be from fentanyl.  -Neurology consulted, VEEG done, acute metabolic encephalopathy. No seizures.  -PSYCH consulted to advise with resuming home meds.  -Patient is planned to go back to long-term nursing home when medically ready.  He is a bed hold.  -11/08 CT head showed: no new acute findings.  Urine tox screen positive for fentanyl.  Suspect that his encephalopathy may be combination of fentanyl usage and resulting hypoxia issues.  Unknown if psychiatric issue contributing.  -Per psych recommendation:  - Recommend restarting Abilify at 5mg PO daily (home dose 30mg)  - Continue with Depakene 250mg PO BID  - Continue with Melatonin 3mg PO at night  - Patient does not meet criteria for inpatient psychiatric admission  - Patient does not require 1:1 observation  - Continue with Olanzapine 2.5mg PO/IM (if unable to take PO) Q6H PRN agitation  - Continue with Trazodone 25mg PO PRN insomnia     Acute hypoxic respiratory failure   -presented to the ED with GCS<8, intubated for airway protection  Now extubated 11/09 at noon.  -Currently on room air, no signs of respiratory distress.  -Likely secondary to opiate overdose.     Hypertensive emergency  -? Non compliance.  -BP improved controlled, systolic now 140s.  -Discharge on carvedilol 3.125 mg twice daily with hold parameters, lisinopril 40 mg daily, Norvasc 10 mg daily, continue Lasix 20 mg daily.  Continue titrating medications based on blood pressure.     Chronic HFpEF 55%.   -Presented with bradycardia with HR 40s (resolved), noted new RBBB, no ST changes, negative troponin, normal lactate.  Likely was secondary to respiratory issues and opiate overdose.  -order ECG showed QTC>512  ms , RBBB, L axix devation. No ST changes   -Okay to resume Lasix 20 mg daily.  P, continue Lasix 20 mg daily atient should be considered for GDMT with Jardiance, but with recent ANNETTE can defer to outpatient and monitoring the creatinine further before consideration of starting.     Acute kidney injury  -Creatinine baseline 0.8.  Patient presented with creatinine of 2.23.  Last creatinine resolved to 1.04.  -Acute kidney injury appears resolved now.  -Okay to resume Lasix 20 mg daily.     Hx of GERD, ?dysphagia   -diet (Noted change in diet texture at the facility from regular to mechanical soft)   -Speech eval pending although per bedside nursing he has been tolerating full liquids without any issues.  No signs of coughing or choking.  -PPI.  -Home med (omprazole 20 mg daily, vitamin B6, vitamin D,      Hx of BPH.  -Continue home Flomax 0.4 mg daily.     Anemia of chronic disease   -Baseline hemoglobin 9-10.  Last hemoglobin 10.3.  Monitor as needed.  -Holding home Ferrous sulfate 325 mg BID      hx of IDDM II (HgbA1C 7.8% on 06/2023)   -Glucose appears to be better controlled last 24 hours.  -cont SSI..  -Currently holding Home med lantus 32 units at HS   -Hypoglycemia protocol         CODE status FULL CODE   Yunior Kelly  Contact Number(s): [phone]  Contact Relationship: guardian    Pertinent Physical Exam At Time of Discharge  General: Lying in bed without distress.  Cooperative.  Skin: No rashes ulcerations.  HEENT: Sclera is white.  Mucous membranes moist.  Neck: Supple.  No JVD.  Cardiac: Regular rate and rhythm, S1/S2 normal.  Lungs: Clear to auscultation bilaterally, no wheezing or crackles, no accessory muscle use at rest.  Abdomen: Soft, nontender, nondistended, BS +  Extremities: No cyanosis.  No lower extremity edema.  Neurologic: Alert and oriented x3.  No focal deficits.  Psychiatric: Appropriate mood and behavior.  Currently no agitation.    Home Medications     Medication List       START taking these medications     amLODIPine 10 mg tablet; Commonly known as: Norvasc; Take 1 tablet (10   mg) by mouth once daily.   OLANZapine 2.5 mg tablet; Commonly known as: ZyPREXA; Take 1 tablet (2.5   mg) by mouth every 6 hours if needed (agitation).   sennosides-docusate sodium 8.6-50 mg tablet; Commonly known as:   Serenity-Colace; Take 1 tablet by mouth 2 times a day.     CHANGE how you take these medications     ARIPiprazole 5 mg tablet; Commonly known as: Abilify; Take 1 tablet (5   mg) by mouth once daily.; What changed: medication strength, how much to   take   carvedilol 3.125 mg tablet; Commonly known as: Coreg; Take 1 tablet   (3.125 mg) by mouth 2 times a day with meals. Hold if heart rate < 60 or   SBP < 110; What changed: additional instructions   gabapentin 100 mg capsule; Commonly known as: Neurontin; Take 2 capsules   (200 mg) by mouth 2 times a day. Hold if lethargic.; What changed:   additional instructions   insulin glargine 100 unit/mL injection; Commonly known as: Lantus U-100   Insulin; Inject 5 Units under the skin once daily at bedtime. Take as   directed per insulin instructions.; What changed: how much to take     CONTINUE taking these medications     acetaminophen 325 mg tablet; Commonly known as: Tylenol   ammonium lactate 12 % cream; Commonly known as: Amlactin   atorvastatin 10 mg tablet; Commonly known as: Lipitor   chlorhexidine 0.12 % solution; Commonly known as: Peridex; Use 15 mL in   the mouth or throat 3 times a day.   cholecalciferol 50 MCG (2000 UT) tablet; Commonly known as: Vitamin D-3   cyclopentolate 1 % ophthalmic solution; Commonly known as: Cyclogyl   divalproex sprinkle 125 mg DR capsule; Commonly known as: Depakote   Sprinkle   donepezil 10 mg tablet; Commonly known as: Aricept   ferrous sulfate (325 mg ferrous sulfate) tablet   furosemide 20 mg tablet; Commonly known as: Lasix   haloperidol 5 mg tablet; Commonly known as: Haldol   hydrOXYzine HCL 50 mg tablet;  Commonly known as: Atarax   insulin lispro 100 unit/mL injection; Commonly known as: HumaLOG   lisinopril 40 mg tablet   melatonin 3 mg tablet   omeprazole 20 mg DR capsule; Commonly known as: PriLOSEC   polyvinyl alcohol 1.4 % ophthalmic solution; Commonly known as:   Liquifilm Tears   prednisoLONE acetate 1 % ophthalmic suspension; Commonly known as:   Pred-Forte   pyridoxine 100 mg tablet; Commonly known as: Vitamin B-6   tamsulosin 0.4 mg 24 hr capsule; Commonly known as: Flomax   traZODone 50 mg tablet; Commonly known as: Desyrel     STOP taking these medications     azithromycin 250 mg tablet; Commonly known as: Zithromax Z-Jason   ibuprofen 600 mg tablet   lactulose 10 gram/15 mL (15 mL) solution   OXcarbazepine 150 mg tablet; Commonly known as: Trileptal       Outpatient Follow-Up  Future Appointments   Date Time Provider Department Center   12/20/2023 10:30 AM Camilel Durham MD [id number] [facility]   1/4/2024  2:30 PM Camille Durham MD [id number] [facility]   1/25/2024 10:30 AM Camille Durham MD [id number] [facility]     Time spent caring for patient and coordinating discharge total 35 minutes.    Emiliana Ramirez MD

## 2023-11-14 NOTE — PROGRESS NOTES
Espinoza Ross is a 67 y.o. male on day 6 of admission presenting with Unresponsive.    Subjective   Received update from Dr. Ramirez that patient is medically cleared for discharge. Requesting transportation after 12pm.    Transport put into [employer] and requested 12pm pickup; awaiting confirmed time. [facility] updated in [facility] and requested report number.    UPDATE 0955 Transport confirmed for 12pm  [facility]. Report number given to bedside RN. Blue form completed and provided to unit secretary.     -Corin MCKINNEY, MA, LSW  [phone] or [facility]  Care Transitions

## 2023-11-14 NOTE — NURSING NOTE
1147: Patient returning to [facility], report given to Yvonne at facility. No belongings at bedside. IV's removed, bandages applied. Monitoring BP, decreased after morning medications given. No further needs. Medically cleared for discharge.

## 2023-11-17 ENCOUNTER — HOSPITAL ENCOUNTER (OUTPATIENT)
Dept: CARDIOLOGY | Facility: HOSPITAL | Age: 68
Discharge: HOME | End: 2023-11-17
Payer: MEDICARE

## 2023-11-17 LAB
ATRIAL RATE: 51 BPM
ATRIAL RATE: 71 BPM
ATRIAL RATE: 73 BPM
P AXIS: 71 DEGREES
P AXIS: 72 DEGREES
P AXIS: 84 DEGREES
P OFFSET: 186 MS
P OFFSET: 193 MS
P OFFSET: 195 MS
P ONSET: 136 MS
P ONSET: 139 MS
P ONSET: 159 MS
PR INTERVAL: 130 MS
PR INTERVAL: 166 MS
PR INTERVAL: 174 MS
Q ONSET: 222 MS
Q ONSET: 223 MS
Q ONSET: 224 MS
QRS COUNT: 12 BEATS
QRS COUNT: 12 BEATS
QRS COUNT: 8 BEATS
QRS DURATION: 122 MS
QRS DURATION: 124 MS
QRS DURATION: 130 MS
QT INTERVAL: 462 MS
QT INTERVAL: 472 MS
QT INTERVAL: 572 MS
QTC CALCULATION(BAZETT): 508 MS
QTC CALCULATION(BAZETT): 512 MS
QTC CALCULATION(BAZETT): 527 MS
QTC FREDERICIA: 493 MS
QTC FREDERICIA: 499 MS
QTC FREDERICIA: 542 MS
R AXIS: -40 DEGREES
R AXIS: -44 DEGREES
R AXIS: -50 DEGREES
T AXIS: 4 DEGREES
T AXIS: 44 DEGREES
T AXIS: 48 DEGREES
T OFFSET: 454 MS
T OFFSET: 458 MS
T OFFSET: 510 MS
VENTRICULAR RATE: 51 BPM
VENTRICULAR RATE: 71 BPM
VENTRICULAR RATE: 73 BPM

## 2023-11-17 PROCEDURE — 93005 ELECTROCARDIOGRAM TRACING: CPT

## 2023-12-08 LAB — HOLD SPECIMEN: NORMAL

## 2023-12-13 DIAGNOSIS — H59.021 CATARACT (LENS) FRAGMENTS IN EYE FOLLOWING CATARACT SURGERY, RIGHT EYE: ICD-10-CM

## 2023-12-13 LAB
HOLD SPECIMEN: NORMAL
HOLD SPECIMEN: NORMAL

## 2023-12-13 RX ORDER — MOXIFLOXACIN 5 MG/ML
1 SOLUTION/ DROPS OPHTHALMIC 4 TIMES DAILY
Qty: 3 ML | Refills: 0 | Status: SHIPPED | OUTPATIENT
Start: 2023-12-13 | End: 2023-12-18

## 2023-12-13 RX ORDER — MOXIFLOXACIN 5 MG/ML
1 SOLUTION/ DROPS OPHTHALMIC 4 TIMES DAILY
COMMUNITY
End: 2023-12-13 | Stop reason: SDUPTHER

## 2023-12-13 RX ORDER — PREDNISOLONE ACETATE 10 MG/ML
1 SUSPENSION/ DROPS OPHTHALMIC 4 TIMES DAILY
Qty: 5 ML | Refills: 0 | Status: SHIPPED | OUTPATIENT
Start: 2023-12-13 | End: 2023-12-18

## 2023-12-18 ENCOUNTER — PHARMACY VISIT (OUTPATIENT)
Dept: PHARMACY | Facility: CLINIC | Age: 68
End: 2023-12-18
Payer: COMMERCIAL

## 2023-12-18 ENCOUNTER — PREP FOR PROCEDURE (OUTPATIENT)
Dept: OPHTHALMOLOGY | Facility: CLINIC | Age: 68
End: 2023-12-18
Payer: MEDICARE

## 2023-12-18 RX ORDER — TROPICAMIDE 10 MG/ML
1 SOLUTION/ DROPS OPHTHALMIC
Status: CANCELLED | OUTPATIENT
Start: 2023-12-18 | End: 2023-12-18

## 2023-12-18 RX ORDER — DICLOFENAC SODIUM 1 MG/ML
1 SOLUTION/ DROPS OPHTHALMIC
Status: CANCELLED | OUTPATIENT
Start: 2023-12-18 | End: 2023-12-18

## 2023-12-18 RX ORDER — CYCLOPENTOLATE HYDROCHLORIDE 10 MG/ML
1 SOLUTION/ DROPS OPHTHALMIC
Status: CANCELLED | OUTPATIENT
Start: 2023-12-18 | End: 2023-12-18

## 2023-12-18 RX ORDER — PHENYLEPHRINE HYDROCHLORIDE 25 MG/ML
1 SOLUTION/ DROPS OPHTHALMIC
Status: CANCELLED | OUTPATIENT
Start: 2023-12-18 | End: 2023-12-18

## 2023-12-18 RX ORDER — MOXIFLOXACIN 5 MG/ML
1 SOLUTION/ DROPS OPHTHALMIC
Status: CANCELLED | OUTPATIENT
Start: 2023-12-18 | End: 2023-12-18

## 2023-12-20 ENCOUNTER — APPOINTMENT (OUTPATIENT)
Dept: OPHTHALMOLOGY | Facility: CLINIC | Age: 68
End: 2023-12-20
Payer: MEDICARE

## 2024-01-04 ENCOUNTER — APPOINTMENT (OUTPATIENT)
Dept: OPHTHALMOLOGY | Facility: CLINIC | Age: 69
End: 2024-01-04
Payer: COMMERCIAL

## 2024-01-10 ENCOUNTER — PREP FOR PROCEDURE (OUTPATIENT)
Dept: OPHTHALMOLOGY | Facility: CLINIC | Age: 69
End: 2024-01-10
Payer: COMMERCIAL

## 2024-01-15 NOTE — PROGRESS NOTES
[facility] [place]     Primary Care Physician: No primary care provider on file.  Date of Visit: 01/18/2024  1:00 PM EST     HPI / Summary:   Espinoza Ross is a 68 y.o. male with paranoid schizophrenia, PSUD, DM (A1C 7.8 6/2023), HTN and Hep C who presents for pre procedure evaluation prior to cataract removal.    He has no known cardiac conditions. Reports rare chest discomfort over the years which comes on randomly and is not associated with exertion. No dyspnea. Had prior troponin measurements which were normal when he had an episode of unresponsiveness. No LE edema.    ROS: Relevant review of symptoms of negative unless discussed above.     Problems:   Patient Active Problem List   Diagnosis    Age-related nuclear cataract of right eye    Nicotine use disorder    Hemorrhoids    Paranoid schizophrenia (CMS/HCC)    Olecranon fracture       Medical History:   No past medical history on file.    Surgical Hx:   No past surgical history on file.     Family Hx:   No family history on file.     Social Hx:    Medications  Current Outpatient Medications   Medication Instructions    acetaminophen (TYLENOL) 650 mg, oral, Every 8 hours PRN, Do not crush, chew, or split.    amLODIPine (NORVASC) 10 mg, oral, Daily    ammonium lactate (Amlactin) 12 % cream 1 Application, Topical, 2 times daily PRN    ARIPiprazole (ABILIFY) 5 mg, oral, Daily    atorvastatin (Lipitor) 10 mg tablet 1 tablet, oral, Nightly    carvedilol (COREG) 3.125 mg, oral, 2 times daily with meals, Hold if heart rate < 60 or SBP < 110    chlorhexidine (Peridex) 0.12 % solution 15 mL, Mouth/Throat, 3 times daily    cholecalciferol (VITAMIN D-3) 2,000 Units, oral, Daily    cyclopentolate (Cyclogyl) 1 % ophthalmic solution 1 drop, Left Eye, Every 12 hours    divalproex sprinkle (DEPAKOTE SPRINKLE) 250 mg, oral, 2 times daily    donepezil (ARICEPT) 10 mg, oral, Nightly    Enulose 10 gram/15 mL oral solution     ferrous  "sulfate 325 (65 Fe) MG tablet 1 tablet, oral, 2 times daily with meals    gabapentin (NEURONTIN) 200 mg, oral, 2 times daily, Hold if lethargic.    Glucagon Emergency Kit, human, 1 mg injection     haloperidol (HALDOL) 5 mg, oral, Every 4 hours PRN    hydrOXYzine HCL (ATARAX) 50 mg, oral, Every 6 hours PRN    insulin glargine (LANTUS U-100 INSULIN) 5 Units, subcutaneous, Nightly, Take as directed per insulin instructions.    insulin lispro (HumaLOG) 100 unit/mL injection subcutaneous, 3 times daily with meals, Take as directed per insulin instructions (sliding scale): <BR>150-200= 2 units<BR>201-250= 4 units<BR>251- 300=6 units <BR>301-350= 8 units<BR>351-400= 10 units    lisinopril 40 mg, oral, Daily RT    melatonin 3 mg tablet 1 tablet, oral, Nightly    OLANZapine (ZYPREXA) 2.5 mg, oral, Every 6 hours PRN    omeprazole (PRILOSEC) 20 mg, oral, Daily before breakfast, Do not crush or chew.    polyvinyl alcohol (Liquifilm Tears) 1.4 % ophthalmic solution 2 drops, Both Eyes, Every 8 hours    pyridoxine (VITAMIN B-6) 100 mg, oral, 2 times daily    sennosides-docusate sodium (Serenity-Colace) 8.6-50 mg tablet 1 tablet, oral, 2 times daily    tamsulosin (FLOMAX) 0.4 mg, oral, Daily RT    traZODone (DESYREL) 25 mg, oral, Daily PRN       Allergies  Penicillins    Exam:   Vitals: /67 (BP Location: Right arm, Patient Position: Sitting, BP Cuff Size: Adult)   Pulse (!) 48   Ht 1.651 m (5' 5\")   Wt 72.7 kg (160 lb 4.8 oz)   SpO2 98%   BMI 26.68 kg/m²   Wt Readings from Last 5 Encounters:   01/18/24 72.7 kg (160 lb 4.8 oz)   11/12/23 72.6 kg (160 lb 0.9 oz)   10/30/23 79.4 kg (175 lb)   03/28/23 74.4 kg (164 lb)   03/10/23 74.4 kg (164 lb)     GEN: Pleasant, well-appearing, somehwat unkempt, ?tardive dyskinesis  HEENT: JVP not elevated  CHEST: Clear to auscultation, No wheeze, good air movement.  CV: normal rate, regular rhythm, no murmurs or rubs  ABD: Soft  EXT: Warm, well perfused, No LE edema.   NEURO: grossly non " "focal  SKIN: No obvious rashes     Labs:   Lipids  No results found for: \"CHOL\"  No results found for: \"HDL\"  No results found for: \"LDLCALC\"  No results found for: \"TRIG\"  No components found for: \"CHOLHDL\"    Hemoglobin A1C  Lab Results   Component Value Date    HGBA1C 7.8 (H) 06/16/2023       BMP  Lab Results   Component Value Date    GLUCOSE 125 (H) 11/10/2023    CALCIUM 8.7 11/10/2023     11/10/2023    K 4.2 11/10/2023    CO2 25 11/10/2023     11/10/2023    BUN 18 11/10/2023    CREATININE 1.04 11/10/2023         Notable Studies: imaging personally reviewed and summarized by me below  EKG:  -11/11/2023: NSR, HR 73, RBBB, LAFB, no Q waves or ST changes.   -1/18/2024: sinus bradycardia HR 48 bpm, RBBB, LAFB, no Q waves or ST changes    Echo:  -11/9/2023: normal LV size and function, calcified papillary muscle, RV not well seen, trace MR (BNP at the time was 7)    Assessment and Plan  Espinoza Ross is a 68 y.o. male with paranoid schizophrenia, PSUD, DM (A1C 7.8 6/2023), HTN and Hep C who presents for pre procedure evaluation prior to cataract removal.    Overall, cataract removal is a low risk surgery. He has no known cardiac conditions. He has no obvious ischemia, heart failure, arrhythmias or critical valve disease. Echocardiogram in 11/2023 was unremarkable and ECG shows sinus bradycardia with RBBB and LAFB. The RBBB has been present. He appears to be on carvedilol. Can stop carvedilol given his HR of 48 bpm and BP of 103/67. He has no cardiac contraindications to surgery. No further testing mandated prior to surgery from a cardiac perspective. He is low risk, but we discussed no one is no risk, for a low risk procedure.     Kd Bustillos MD  Director, Sports Cardiology  [facility]    Part of this note was completed using dictation and voice recognition software. Please excuse minor errors and typos.   viewing medical testing, obtaining medical history and counselling " and educating on diagnosis and documenting clinical encounter.

## 2024-01-17 PROBLEM — S52.023A OLECRANON FRACTURE: Status: ACTIVE | Noted: 2024-01-17

## 2024-01-17 PROBLEM — F20.0 PARANOID SCHIZOPHRENIA (MULTI): Status: ACTIVE | Noted: 2023-06-15

## 2024-01-17 PROBLEM — F17.200 NICOTINE USE DISORDER: Status: ACTIVE | Noted: 2023-06-16

## 2024-01-17 RX ORDER — HYDROXYZINE 50 MG/ML
INJECTION, SOLUTION INTRAMUSCULAR
COMMUNITY
Start: 2023-08-08 | End: 2024-01-18 | Stop reason: ALTCHOICE

## 2024-01-17 RX ORDER — GLUCAGON 1 MG
VIAL (EA) INJECTION
COMMUNITY
Start: 2023-04-30

## 2024-01-17 RX ORDER — PANTOPRAZOLE SODIUM 40 MG/1
TABLET, DELAYED RELEASE ORAL
COMMUNITY
Start: 2023-02-20 | End: 2024-01-18 | Stop reason: ALTCHOICE

## 2024-01-17 RX ORDER — FAMOTIDINE 40 MG/1
TABLET, FILM COATED ORAL
COMMUNITY
Start: 2023-02-20 | End: 2024-01-18 | Stop reason: ALTCHOICE

## 2024-01-17 RX ORDER — LACTULOSE 10 G/15ML
SOLUTION ORAL; RECTAL
COMMUNITY
Start: 2023-12-13

## 2024-01-17 RX ORDER — PROPRANOLOL HYDROCHLORIDE 80 MG/1
CAPSULE, EXTENDED RELEASE ORAL
COMMUNITY
Start: 2023-05-26 | End: 2024-01-18 | Stop reason: WASHOUT

## 2024-01-18 ENCOUNTER — OFFICE VISIT (OUTPATIENT)
Dept: CARDIOLOGY | Facility: CLINIC | Age: 69
End: 2024-01-18
Payer: COMMERCIAL

## 2024-01-18 VITALS
DIASTOLIC BLOOD PRESSURE: 67 MMHG | WEIGHT: 160.3 LBS | SYSTOLIC BLOOD PRESSURE: 103 MMHG | HEART RATE: 48 BPM | OXYGEN SATURATION: 98 % | BODY MASS INDEX: 26.71 KG/M2 | HEIGHT: 65 IN

## 2024-01-18 DIAGNOSIS — Z01.818 PRE-OPERATIVE CLEARANCE: ICD-10-CM

## 2024-01-18 PROCEDURE — 93010 ELECTROCARDIOGRAM REPORT: CPT | Performed by: INTERNAL MEDICINE

## 2024-01-18 PROCEDURE — 1159F MED LIST DOCD IN RCRD: CPT | Performed by: INTERNAL MEDICINE

## 2024-01-18 PROCEDURE — 1126F AMNT PAIN NOTED NONE PRSNT: CPT | Performed by: INTERNAL MEDICINE

## 2024-01-18 PROCEDURE — 99214 OFFICE O/P EST MOD 30 MIN: CPT | Performed by: INTERNAL MEDICINE

## 2024-01-18 PROCEDURE — 93005 ELECTROCARDIOGRAM TRACING: CPT | Performed by: INTERNAL MEDICINE

## 2024-01-18 PROCEDURE — 1036F TOBACCO NON-USER: CPT | Performed by: INTERNAL MEDICINE

## 2024-01-18 ASSESSMENT — PATIENT HEALTH QUESTIONNAIRE - PHQ9
1. LITTLE INTEREST OR PLEASURE IN DOING THINGS: NOT AT ALL
2. FEELING DOWN, DEPRESSED OR HOPELESS: NOT AT ALL
SUM OF ALL RESPONSES TO PHQ9 QUESTIONS 1 AND 2: 0

## 2024-01-18 ASSESSMENT — ENCOUNTER SYMPTOMS
DEPRESSION: 0
OCCASIONAL FEELINGS OF UNSTEADINESS: 1
LOSS OF SENSATION IN FEET: 1

## 2024-01-18 ASSESSMENT — PAIN SCALES - GENERAL: PAINLEVEL: 0-NO PAIN

## 2024-01-24 ENCOUNTER — ANESTHESIA EVENT (OUTPATIENT)
Dept: OPERATING ROOM | Facility: HOSPITAL | Age: 69
End: 2024-01-24
Payer: COMMERCIAL

## 2024-01-24 ENCOUNTER — ANESTHESIA (OUTPATIENT)
Dept: OPERATING ROOM | Facility: HOSPITAL | Age: 69
End: 2024-01-24
Payer: COMMERCIAL

## 2024-01-24 ENCOUNTER — HOSPITAL ENCOUNTER (OUTPATIENT)
Facility: HOSPITAL | Age: 69
Setting detail: OUTPATIENT SURGERY
Discharge: HOME | End: 2024-01-24
Attending: OPHTHALMOLOGY | Admitting: OPHTHALMOLOGY
Payer: COMMERCIAL

## 2024-01-24 VITALS
HEIGHT: 65 IN | OXYGEN SATURATION: 100 % | BODY MASS INDEX: 26.52 KG/M2 | WEIGHT: 159.17 LBS | TEMPERATURE: 97.2 F | SYSTOLIC BLOOD PRESSURE: 158 MMHG | RESPIRATION RATE: 12 BRPM | HEART RATE: 49 BPM | DIASTOLIC BLOOD PRESSURE: 77 MMHG

## 2024-01-24 DIAGNOSIS — H25.11 AGE-RELATED NUCLEAR CATARACT OF RIGHT EYE: Primary | ICD-10-CM

## 2024-01-24 LAB
GLUCOSE BLD MANUAL STRIP-MCNC: 129 MG/DL (ref 74–99)
GLUCOSE BLD MANUAL STRIP-MCNC: 131 MG/DL (ref 74–99)

## 2024-01-24 PROCEDURE — 2500000004 HC RX 250 GENERAL PHARMACY W/ HCPCS (ALT 636 FOR OP/ED): Performed by: OPHTHALMOLOGY

## 2024-01-24 PROCEDURE — 82947 ASSAY GLUCOSE BLOOD QUANT: CPT

## 2024-01-24 PROCEDURE — 3700000002 HC GENERAL ANESTHESIA TIME - EACH INCREMENTAL 1 MINUTE: Performed by: OPHTHALMOLOGY

## 2024-01-24 PROCEDURE — 7100000010 HC PHASE TWO TIME - EACH INCREMENTAL 1 MINUTE: Performed by: OPHTHALMOLOGY

## 2024-01-24 PROCEDURE — 2500000001 HC RX 250 WO HCPCS SELF ADMINISTERED DRUGS (ALT 637 FOR MEDICARE OP): Performed by: STUDENT IN AN ORGANIZED HEALTH CARE EDUCATION/TRAINING PROGRAM

## 2024-01-24 PROCEDURE — A66984 PR REMV CATARACT EXTRACAP,INSERT LENS: Performed by: ANESTHESIOLOGY

## 2024-01-24 PROCEDURE — 2760000001 HC OR 276 NO HCPCS: Performed by: OPHTHALMOLOGY

## 2024-01-24 PROCEDURE — 2500000001 HC RX 250 WO HCPCS SELF ADMINISTERED DRUGS (ALT 637 FOR MEDICARE OP): Performed by: OPHTHALMOLOGY

## 2024-01-24 PROCEDURE — 3700000001 HC GENERAL ANESTHESIA TIME - INITIAL BASE CHARGE: Performed by: OPHTHALMOLOGY

## 2024-01-24 PROCEDURE — 2500000001 HC RX 250 WO HCPCS SELF ADMINISTERED DRUGS (ALT 637 FOR MEDICARE OP)

## 2024-01-24 PROCEDURE — 7100000009 HC PHASE TWO TIME - INITIAL BASE CHARGE: Performed by: OPHTHALMOLOGY

## 2024-01-24 PROCEDURE — 66984 XCAPSL CTRC RMVL W/O ECP: CPT | Performed by: OPHTHALMOLOGY

## 2024-01-24 PROCEDURE — 7100000002 HC RECOVERY ROOM TIME - EACH INCREMENTAL 1 MINUTE: Performed by: OPHTHALMOLOGY

## 2024-01-24 PROCEDURE — 3600000008 HC OR TIME - EACH INCREMENTAL 1 MINUTE - PROCEDURE LEVEL THREE: Performed by: OPHTHALMOLOGY

## 2024-01-24 PROCEDURE — 2500000004 HC RX 250 GENERAL PHARMACY W/ HCPCS (ALT 636 FOR OP/ED): Performed by: ANESTHESIOLOGY

## 2024-01-24 PROCEDURE — 2720000007 HC OR 272 NO HCPCS: Performed by: OPHTHALMOLOGY

## 2024-01-24 PROCEDURE — 3600000003 HC OR TIME - INITIAL BASE CHARGE - PROCEDURE LEVEL THREE: Performed by: OPHTHALMOLOGY

## 2024-01-24 PROCEDURE — 7100000001 HC RECOVERY ROOM TIME - INITIAL BASE CHARGE: Performed by: OPHTHALMOLOGY

## 2024-01-24 PROCEDURE — C1780 LENS, INTRAOCULAR (NEW TECH): HCPCS | Performed by: OPHTHALMOLOGY

## 2024-01-24 DEVICE — IMPLANTABLE DEVICE: Type: IMPLANTABLE DEVICE | Site: EYE | Status: FUNCTIONAL

## 2024-01-24 RX ORDER — FENTANYL CITRATE 50 UG/ML
INJECTION, SOLUTION INTRAMUSCULAR; INTRAVENOUS AS NEEDED
Status: DISCONTINUED | OUTPATIENT
Start: 2024-01-24 | End: 2024-01-24

## 2024-01-24 RX ORDER — DICLOFENAC SODIUM 1 MG/ML
SOLUTION/ DROPS OPHTHALMIC AS NEEDED
Status: DISCONTINUED | OUTPATIENT
Start: 2024-01-24 | End: 2024-01-24 | Stop reason: HOSPADM

## 2024-01-24 RX ORDER — MIDAZOLAM HYDROCHLORIDE 1 MG/ML
INJECTION, SOLUTION INTRAMUSCULAR; INTRAVENOUS AS NEEDED
Status: DISCONTINUED | OUTPATIENT
Start: 2024-01-24 | End: 2024-01-24

## 2024-01-24 RX ORDER — BRIMONIDINE TARTRATE 1 MG/ML
1 SOLUTION/ DROPS OPHTHALMIC ONCE
Status: COMPLETED | OUTPATIENT
Start: 2024-01-24 | End: 2024-01-24

## 2024-01-24 RX ORDER — SODIUM CHLORIDE, SODIUM LACTATE, POTASSIUM CHLORIDE, CALCIUM CHLORIDE 600; 310; 30; 20 MG/100ML; MG/100ML; MG/100ML; MG/100ML
100 INJECTION, SOLUTION INTRAVENOUS CONTINUOUS
Status: DISCONTINUED | OUTPATIENT
Start: 2024-01-24 | End: 2024-01-24 | Stop reason: HOSPADM

## 2024-01-24 RX ORDER — ONDANSETRON HYDROCHLORIDE 2 MG/ML
4 INJECTION, SOLUTION INTRAVENOUS ONCE AS NEEDED
Status: DISCONTINUED | OUTPATIENT
Start: 2024-01-24 | End: 2024-01-24 | Stop reason: HOSPADM

## 2024-01-24 RX ORDER — PREDNISOLONE ACETATE 10 MG/ML
SUSPENSION/ DROPS OPHTHALMIC AS NEEDED
Status: DISCONTINUED | OUTPATIENT
Start: 2024-01-24 | End: 2024-01-24 | Stop reason: HOSPADM

## 2024-01-24 RX ORDER — PREDNISOLONE ACETATE 10 MG/ML
1 SUSPENSION/ DROPS OPHTHALMIC 4 TIMES DAILY
Qty: 5 ML | Refills: 0 | Status: SHIPPED | OUTPATIENT
Start: 2024-01-24

## 2024-01-24 RX ORDER — EPINEPHRINE 1 MG/ML
INJECTION, SOLUTION, CONCENTRATE INTRAVENOUS
Status: DISCONTINUED
Start: 2024-01-24 | End: 2024-01-24 | Stop reason: HOSPADM

## 2024-01-24 RX ORDER — MOXIFLOXACIN 5 MG/ML
1 SOLUTION/ DROPS OPHTHALMIC
Status: COMPLETED | OUTPATIENT
Start: 2024-01-24 | End: 2024-01-24

## 2024-01-24 RX ORDER — MOXIFLOXACIN 5 MG/ML
SOLUTION/ DROPS OPHTHALMIC AS NEEDED
Status: DISCONTINUED | OUTPATIENT
Start: 2024-01-24 | End: 2024-01-24 | Stop reason: HOSPADM

## 2024-01-24 RX ORDER — DICLOFENAC SODIUM 1 MG/ML
1 SOLUTION/ DROPS OPHTHALMIC
Status: COMPLETED | OUTPATIENT
Start: 2024-01-24 | End: 2024-01-24

## 2024-01-24 RX ORDER — TETRACAINE HYDROCHLORIDE 5 MG/ML
SOLUTION OPHTHALMIC AS NEEDED
Status: DISCONTINUED | OUTPATIENT
Start: 2024-01-24 | End: 2024-01-24 | Stop reason: HOSPADM

## 2024-01-24 RX ORDER — LIDOCAINE IN NACL,ISO-OSMOT/PF 30 MG/3 ML
0.1 SYRINGE (ML) INJECTION ONCE
Status: DISCONTINUED | OUTPATIENT
Start: 2024-01-24 | End: 2024-01-24 | Stop reason: HOSPADM

## 2024-01-24 RX ORDER — MOXIFLOXACIN 5 MG/ML
1 SOLUTION/ DROPS OPHTHALMIC 4 TIMES DAILY
Qty: 3 ML | Refills: 0 | Status: SHIPPED | OUTPATIENT
Start: 2024-01-24

## 2024-01-24 RX ORDER — TROPICAMIDE 10 MG/ML
1 SOLUTION/ DROPS OPHTHALMIC
Status: COMPLETED | OUTPATIENT
Start: 2024-01-24 | End: 2024-01-24

## 2024-01-24 RX ORDER — EPINEPHRINE 1 MG/ML
INJECTION, SOLUTION, CONCENTRATE INTRAVENOUS AS NEEDED
Status: DISCONTINUED | OUTPATIENT
Start: 2024-01-24 | End: 2024-01-24 | Stop reason: HOSPADM

## 2024-01-24 RX ORDER — CYCLOPENTOLATE HYDROCHLORIDE 10 MG/ML
1 SOLUTION/ DROPS OPHTHALMIC
Status: COMPLETED | OUTPATIENT
Start: 2024-01-24 | End: 2024-01-24

## 2024-01-24 RX ORDER — PHENYLEPHRINE HYDROCHLORIDE 25 MG/ML
1 SOLUTION/ DROPS OPHTHALMIC
Status: COMPLETED | OUTPATIENT
Start: 2024-01-24 | End: 2024-01-24

## 2024-01-24 RX ADMIN — PHENYLEPHRINE HYDROCHLORIDE 1 DROP: 25 SOLUTION/ DROPS OPHTHALMIC at 08:01

## 2024-01-24 RX ADMIN — FENTANYL CITRATE 25 MCG: 50 INJECTION, SOLUTION INTRAMUSCULAR; INTRAVENOUS at 08:53

## 2024-01-24 RX ADMIN — CYCLOPENTOLATE HYDROCHLORIDE 1 DROP: 10 SOLUTION/ DROPS OPHTHALMIC at 07:49

## 2024-01-24 RX ADMIN — TROPICAMIDE 1 DROP: 10 SOLUTION/ DROPS OPHTHALMIC at 07:49

## 2024-01-24 RX ADMIN — PHENYLEPHRINE HYDROCHLORIDE 1 DROP: 25 SOLUTION/ DROPS OPHTHALMIC at 07:50

## 2024-01-24 RX ADMIN — TROPICAMIDE 1 DROP: 10 SOLUTION/ DROPS OPHTHALMIC at 08:01

## 2024-01-24 RX ADMIN — TROPICAMIDE 1 DROP: 10 SOLUTION/ DROPS OPHTHALMIC at 07:55

## 2024-01-24 RX ADMIN — BRIMONIDINE TARTRATE 1 DROP: 1 SOLUTION/ DROPS OPHTHALMIC at 10:19

## 2024-01-24 RX ADMIN — DICLOFENAC SODIUM 1 DROP: 1 SOLUTION/ DROPS OPHTHALMIC at 08:01

## 2024-01-24 RX ADMIN — MOXIFLOXACIN OPHTHALMIC SOLUTION 1 DROP: 5 SOLUTION/ DROPS OPHTHALMIC at 08:01

## 2024-01-24 RX ADMIN — MIDAZOLAM 2 MG: 1 INJECTION INTRAMUSCULAR; INTRAVENOUS at 08:27

## 2024-01-24 RX ADMIN — MOXIFLOXACIN OPHTHALMIC SOLUTION 1 DROP: 5 SOLUTION/ DROPS OPHTHALMIC at 07:49

## 2024-01-24 RX ADMIN — SODIUM CHLORIDE, SODIUM LACTATE, POTASSIUM CHLORIDE, AND CALCIUM CHLORIDE: 600; 310; 30; 20 INJECTION, SOLUTION INTRAVENOUS at 09:15

## 2024-01-24 RX ADMIN — DICLOFENAC SODIUM 1 DROP: 1 SOLUTION/ DROPS OPHTHALMIC at 07:50

## 2024-01-24 RX ADMIN — PHENYLEPHRINE HYDROCHLORIDE 1 DROP: 25 SOLUTION/ DROPS OPHTHALMIC at 07:54

## 2024-01-24 RX ADMIN — CYCLOPENTOLATE HYDROCHLORIDE 1 DROP: 10 SOLUTION/ DROPS OPHTHALMIC at 08:01

## 2024-01-24 RX ADMIN — DICLOFENAC SODIUM 1 DROP: 1 SOLUTION/ DROPS OPHTHALMIC at 07:54

## 2024-01-24 RX ADMIN — CYCLOPENTOLATE HYDROCHLORIDE 1 DROP: 10 SOLUTION/ DROPS OPHTHALMIC at 07:54

## 2024-01-24 RX ADMIN — FENTANYL CITRATE 25 MCG: 50 INJECTION, SOLUTION INTRAMUSCULAR; INTRAVENOUS at 08:38

## 2024-01-24 RX ADMIN — MOXIFLOXACIN OPHTHALMIC SOLUTION 1 DROP: 5 SOLUTION/ DROPS OPHTHALMIC at 07:55

## 2024-01-24 RX ADMIN — FENTANYL CITRATE 25 MCG: 50 INJECTION, SOLUTION INTRAMUSCULAR; INTRAVENOUS at 08:35

## 2024-01-24 RX ADMIN — FENTANYL CITRATE 25 MCG: 50 INJECTION, SOLUTION INTRAMUSCULAR; INTRAVENOUS at 09:00

## 2024-01-24 ASSESSMENT — PAIN SCALES - GENERAL
PAINLEVEL_OUTOF10: 0 - NO PAIN
PAINLEVEL_OUTOF10: 7
PAINLEVEL_OUTOF10: 0 - NO PAIN
PAIN_LEVEL: 0
PAINLEVEL_OUTOF10: 0 - NO PAIN

## 2024-01-24 ASSESSMENT — ENCOUNTER SYMPTOMS
WEAKNESS: 0
EYE ITCHING: 0
EYE PAIN: 0
ABDOMINAL PAIN: 0
FEVER: 0
CHILLS: 0
FLANK PAIN: 0
EYE DISCHARGE: 0
MYALGIAS: 0
SINUS PRESSURE: 0
NERVOUS/ANXIOUS: 0
ARTHRALGIAS: 0
ABDOMINAL DISTENTION: 0
PHOTOPHOBIA: 0
EYE REDNESS: 0
CHEST TIGHTNESS: 0
NUMBNESS: 0

## 2024-01-24 ASSESSMENT — PAIN - FUNCTIONAL ASSESSMENT
PAIN_FUNCTIONAL_ASSESSMENT: 0-10

## 2024-01-24 NOTE — DISCHARGE SUMMARY
Discharge Diagnosis  Age-related nuclear cataract of right eye    Issues Requiring Follow-Up  Follow up tomorrow 1/25/24 AM    Test Results Pending At Discharge  Pending Labs       No current pending labs.            Hospital Course   Patient presented for cataract extraction with intraocular lens placement, right eye. Procedure tolerated well without complication. Patient discharged home with moxi/pred drops and follow up.    Pertinent Physical Exam At Time of Discharge  Physical Exam    Home Medications     Medication List      START taking these medications     moxifloxacin 0.5 % ophthalmic solution; Commonly known as: Vigamox;   Administer 1 drop into the right eye 4 times a day.   prednisoLONE acetate 1 % ophthalmic suspension; Commonly known as:   Pred-Forte; Administer 1 drop into the right eye 4 times a day.     CONTINUE taking these medications     acetaminophen 325 mg tablet; Commonly known as: Tylenol   amLODIPine 10 mg tablet; Commonly known as: Norvasc; Take 1 tablet (10   mg) by mouth once daily.   ammonium lactate 12 % cream; Commonly known as: Amlactin   ARIPiprazole 5 mg tablet; Commonly known as: Abilify; Take 1 tablet (5   mg) by mouth once daily.   atorvastatin 10 mg tablet; Commonly known as: Lipitor   carvedilol 3.125 mg tablet; Commonly known as: Coreg; Take 1 tablet   (3.125 mg) by mouth 2 times a day with meals. Hold if heart rate < 60 or   SBP < 110   chlorhexidine 0.12 % solution; Commonly known as: Peridex; Use 15 mL in   the mouth or throat 3 times a day.   cholecalciferol 50 MCG (2000 UT) tablet; Commonly known as: Vitamin D-3   cyclopentolate 1 % ophthalmic solution; Commonly known as: Cyclogyl   divalproex sprinkle 125 mg DR capsule; Commonly known as: Depakote   Sprinkle   donepezil 10 mg tablet; Commonly known as: Aricept   Enulose 20 gram/30 mL oral solution; Generic drug: lactulose   ferrous sulfate (325 mg ferrous sulfate) tablet   gabapentin 100 mg capsule; Commonly known as:  Neurontin; Take 2 capsules   (200 mg) by mouth 2 times a day. Hold if lethargic.   Glucagon Emergency Kit (human) 1 mg injection; Generic drug: glucagon   haloperidol 5 mg tablet; Commonly known as: Haldol   hydrOXYzine HCL 50 mg tablet; Commonly known as: Atarax   insulin glargine 100 unit/mL injection; Commonly known as: Lantus U-100   Insulin; Inject 5 Units under the skin once daily at bedtime. Take as   directed per insulin instructions.   insulin lispro 100 unit/mL injection; Commonly known as: HumaLOG   lisinopril 40 mg tablet   melatonin 3 mg tablet   omeprazole 20 mg DR capsule; Commonly known as: PriLOSEC   polyvinyl alcohol 1.4 % ophthalmic solution; Commonly known as:   Liquifilm Tears   pyridoxine 100 mg tablet; Commonly known as: Vitamin B-6   sennosides-docusate sodium 8.6-50 mg tablet; Commonly known as:   Serenity-Colace; Take 1 tablet by mouth 2 times a day.   tamsulosin 0.4 mg 24 hr capsule; Commonly known as: Flomax   traZODone 50 mg tablet; Commonly known as: Desyrel     ASK your doctor about these medications     OLANZapine 2.5 mg tablet; Commonly known as: ZyPREXA; Take 1 tablet (2.5   mg) by mouth every 6 hours if needed (agitation).       Outpatient Follow-Up  Future Appointments   Date Time Provider Department Center   1/25/2024  3:00 PM Camille Durham MD [id number] [facility]   2/1/2024  1:45 PM Camille Durham MD [id number] [facility]   3/14/2024  9:00 AM Camille Durham MD [id number] [facility]       Nahum Beltran MD

## 2024-01-24 NOTE — ANESTHESIA PREPROCEDURE EVALUATION
Patient: Espinoza Ross    Procedure Information       Date/Time: 01/24/24 0815    Procedure: Extraction Cataract with Insertion Intraocular Lens (Right)    Location: [facility] OR 02 / [facility] OR    Surgeons: Camille Durham MD            Relevant Problems   Neuro/Psych   (+) Paranoid schizophrenia (CMS/HCC)       Clinical information reviewed:   Tobacco  Allergies  Meds   Med Hx  Surg Hx   Fam Hx  Soc Hx        NPO Detail:  NPO/Void Status  Carbohydrate Drink Given Prior to Surgery? : N  Date of Last Liquid: 01/23/24  Date of Last Solid: 01/23/24         Physical Exam    Airway  Mallampati: III  Neck ROM: full     Cardiovascular   Rhythm: regular     Dental   Comments: No teeth, no dentures   Pulmonary - normal exam     Abdominal   Abdomen: soft             Anesthesia Plan    History of general anesthesia?: yes  History of complications of general anesthesia?: no    ASA 3     MAC     Anesthetic plan and risks discussed with patient.    Plan discussed with attending and resident.

## 2024-01-24 NOTE — H&P
History Of Present Illness  Espinoza Ross is a 68 y.o. male presenting with visually significant cataract, right eye.     Past Medical History  History reviewed. No pertinent past medical history.    Surgical History  History reviewed. No pertinent surgical history.     Social History  He reports that he has quit smoking. His smoking use included cigarettes. He has never used smokeless tobacco. He reports that he does not currently use alcohol. He reports current drug use. Drug: Fentanyl.    Family History  No family history on file.     Allergies  Penicillins    Review of Systems   Constitutional:  Negative for chills and fever.   HENT:  Negative for sinus pressure.    Eyes:  Negative for photophobia, pain, discharge, redness and itching.   Respiratory:  Negative for chest tightness.    Cardiovascular:  Negative for chest pain.   Gastrointestinal:  Negative for abdominal distention and abdominal pain.   Endocrine: Negative for polyuria.   Genitourinary:  Negative for flank pain.   Musculoskeletal:  Negative for arthralgias and myalgias.   Neurological:  Negative for weakness and numbness.   Psychiatric/Behavioral:  The patient is not nervous/anxious.         Physical Exam  Constitutional:       Appearance: Normal appearance.   HENT:      Head: Normocephalic and atraumatic.      Nose: Nose normal.      Mouth/Throat:      Mouth: Mucous membranes are moist.   Eyes:      Conjunctiva/sclera: Conjunctivae normal.      Pupils: Pupils are equal, round, and reactive to light.   Cardiovascular:      Pulses: Normal pulses.   Pulmonary:      Effort: Pulmonary effort is normal. No respiratory distress.   Abdominal:      General: Abdomen is flat.      Palpations: Abdomen is soft.   Musculoskeletal:         General: No deformity.      Cervical back: Neck supple.   Skin:     General: Skin is warm.      Capillary Refill: Capillary refill takes less than 2 seconds.   Neurological:      General: No focal deficit present.       "Mental Status: He is alert and oriented to person, place, and time. Mental status is at baseline.   Psychiatric:         Mood and Affect: Mood normal.         Behavior: Behavior normal.         Thought Content: Thought content normal.         Judgment: Judgment normal.          Last Recorded Vitals  Blood pressure 143/72, pulse 56, temperature 36.6 °C (97.9 °F), resp. rate 16, height 1.651 m (5' 5\"), weight 72.2 kg (159 lb 2.8 oz), SpO2 100 %.    Relevant Results             Assessment/Plan   Principal Problem:    Age-related nuclear cataract of right eye      Patient presents for catarct extraction with intraocular lens placement right eye, will proceed today. See post op note following procedure.           Nahum Beltran MD    "

## 2024-01-24 NOTE — OP NOTE
Cataract Phacoemulsification w/I0L (R) Operative Note     Date: 2024  OR Location: [facility] OR    Name: Espinoza Ross, : 1955, Age: 68 y.o., MRN: [id number], Sex: male    Diagnosis  Pre-op Diagnosis     * Age-related nuclear cataract of right eye [H25.11] Post-op Diagnosis     * Age-related nuclear cataract of right eye [H25.11]     Procedures  Extraction Cataract with Insertion Intraocular Lens  54359 - NE XCAPSL CTRC RMVL INSJ IO LENS PROSTH W/O ECP  , RIGHT    Surgeons      * Camille Durham - Primary    Resident/Fellow/Other Assistant:  Devin    Procedure Summary  Anesthesia: Monitor Anesthesia Care  ASA: III  Anesthesia Staff:   Anesthesiologist: Bertha Dacosta MD  Anesthesia Resident: Becky Padgett MD  Estimated Blood Loss: none        Specimen: No specimens collected     Staff:   Circulator: Alisa lEias RN; Tia Bedolla RN  Scrub Person: Sal Blue          Findings: as expected    Indications: Espinoza Ross is an 68 y.o. male who is having surgery for Age-related nuclear cataract of right eye [H25.11]. RIGHT    The patient was seen in the preoperative area. The risks, benefits, complications, treatment options, non-operative alternatives, expected recovery and outcomes were discussed with the patient. The possibilities of reaction to medication, pulmonary aspiration, injury to surrounding structures, bleeding, recurrent infection, the need for additional procedures, failure to diagnose a condition, and creating a complication or operation were discussed with the patient. The patient concurred with the proposed plan, giving informed consent.  The site of surgery was properly noted/marked if necessary per policy.     Procedure Details:   The patient was escorted to the operating room where a time out was completed   and monitored anesthesia care was begun. The patient was prepped and draped in   the usual sterile fashion for intraocular surgery. A lid speculum was placed   and  the operating microscope was positioned. A paracentesis was made to the   left of the planned cataract incision with a 1mm blade.  Shugarcaine followed   by Viscoat was used to replace the aqueous humor. A temporal clear corneal   wound was made with a 2.4 mm keratome, extending 2 mm into clear cornea before   entering the anterior chamber. A continuous curvilinear  capsulorhexis of   approximately 5 mm in diameter was performed. Hydrodissection was performed   using a canula. The pre-chopper was used to crack the nucleus into smaller   pieces which were then removed with the assistance of a horizontal chopper and   phaco hand piece. Residual cortex was removed with IA. ProVisc was used to   inflate the capsular bag. The lens implant  andres SN60WF 18.0 diopter   intraocular lens. The lens was inserted into the capsular bag and rotated to   the proper position with a francisco. Residual viscoelastic was removed from the   eye with the irrigation/aspiration instrument. The wounds were checked and   found to be watertight. The anterior chamber was at physiologic depth and   pressure. The lid speculum was removed and a patch and shield were taped in   place. The patient tolerated the procedure well, and there were no   complications.   Complications:  None   Disposition: stable          Attending Attestation: I was present and scrubbed for the entire procedure    Camille Durham  Phone Number: [phone]

## 2024-01-24 NOTE — DISCHARGE INSTRUCTIONS
No lifting, bending or heavy straining. No pooled water, avoid water in the eye. Start prednisilone acetate and moxifloxacin drops four times a day. Start 1/24 @6:00 PM and once more before bedtime. Follow up tomorrow 1/25/2024 @ 3:00PM at the [facility] eye clinic on the third floor.     If any concerns or emergencies, please call [phone], after hours [phone].

## 2024-01-24 NOTE — ANESTHESIA POSTPROCEDURE EVALUATION
Patient: Espinoza Ross    Procedure Summary       Date: 01/24/24 Room / Location: [facility] OR 02 / [facility]    Anesthesia Start: 0822 Anesthesia Stop: 0917    Procedure: Extraction Cataract with Insertion Intraocular Lens (Right) Diagnosis:       Age-related nuclear cataract of right eye      (Age-related nuclear cataract of right eye [H25.11])    Surgeons: Camille Durham MD Responsible Provider: Bertha Dacosta MD    Anesthesia Type: MAC ASA Status: 3            Anesthesia Type: MAC    Vitals Value Taken Time   /68 01/24/24 0912   Temp 36.1 °C (97 °F) 01/24/24 0912   Pulse 51 01/24/24 0912   Resp 10 01/24/24 0912   SpO2 100 % 01/24/24 0912       Anesthesia Post Evaluation    Patient location during evaluation: PACU  Patient participation: complete - patient participated  Level of consciousness: awake  Pain score: 0  Pain management: adequate  Airway patency: patent  Cardiovascular status: acceptable  Respiratory status: acceptable  Hydration status: acceptable  Postoperative Nausea and Vomiting: none        No notable events documented.

## 2024-01-25 ENCOUNTER — OFFICE VISIT (OUTPATIENT)
Dept: OPHTHALMOLOGY | Facility: CLINIC | Age: 69
End: 2024-01-25
Payer: COMMERCIAL

## 2024-01-25 ENCOUNTER — APPOINTMENT (OUTPATIENT)
Dept: OPHTHALMOLOGY | Facility: CLINIC | Age: 69
End: 2024-01-25
Payer: COMMERCIAL

## 2024-01-25 DIAGNOSIS — Z96.1 PSEUDOPHAKIA: Primary | ICD-10-CM

## 2024-01-25 PROCEDURE — 99024 POSTOP FOLLOW-UP VISIT: CPT | Performed by: OPHTHALMOLOGY

## 2024-01-25 ASSESSMENT — TONOMETRY
OD_IOP_MMHG: 9
IOP_METHOD: GOLDMANN APPLANATION

## 2024-01-25 ASSESSMENT — EXTERNAL EXAM - RIGHT EYE: OD_EXAM: NORMAL

## 2024-01-25 ASSESSMENT — EXTERNAL EXAM - LEFT EYE: OS_EXAM: NORMAL

## 2024-01-25 ASSESSMENT — ENCOUNTER SYMPTOMS
MUSCULOSKELETAL NEGATIVE: 0
ALLERGIC/IMMUNOLOGIC NEGATIVE: 0
HEMATOLOGIC/LYMPHATIC NEGATIVE: 0
CONSTITUTIONAL NEGATIVE: 0
EYES NEGATIVE: 0
RESPIRATORY NEGATIVE: 0
NEUROLOGICAL NEGATIVE: 0
CARDIOVASCULAR NEGATIVE: 0
GASTROINTESTINAL NEGATIVE: 0
PSYCHIATRIC NEGATIVE: 0
ENDOCRINE NEGATIVE: 0

## 2024-01-25 ASSESSMENT — VISUAL ACUITY
METHOD: SNELLEN - LINEAR
OD_SC: 20/20

## 2024-01-25 ASSESSMENT — CUP TO DISC RATIO
OS_RATIO: .4
OD_RATIO: .4

## 2024-01-25 ASSESSMENT — SLIT LAMP EXAM - LIDS
COMMENTS: GOOD POSITION
COMMENTS: GOOD POSITION

## 2024-01-25 NOTE — PROGRESS NOTES
POD1 s/p CE+IOL OD    Doing well, vision great   Continue pred/moxi QID  Shield/activity precautions   F/u 1 week sooner PRN

## 2024-01-29 LAB
ATRIAL RATE: 48 BPM
P AXIS: 33 DEGREES
P OFFSET: 191 MS
P ONSET: 147 MS
PR INTERVAL: 150 MS
Q ONSET: 222 MS
QRS COUNT: 8 BEATS
QRS DURATION: 130 MS
QT INTERVAL: 496 MS
QTC CALCULATION(BAZETT): 443 MS
QTC FREDERICIA: 460 MS
R AXIS: -46 DEGREES
T AXIS: 46 DEGREES
T OFFSET: 470 MS
VENTRICULAR RATE: 48 BPM

## 2024-02-01 ENCOUNTER — OFFICE VISIT (OUTPATIENT)
Dept: OPHTHALMOLOGY | Facility: CLINIC | Age: 69
End: 2024-02-01
Payer: COMMERCIAL

## 2024-02-01 DIAGNOSIS — Z96.1 PSEUDOPHAKIA: Primary | ICD-10-CM

## 2024-02-01 PROCEDURE — 99024 POSTOP FOLLOW-UP VISIT: CPT | Performed by: OPHTHALMOLOGY

## 2024-02-01 ASSESSMENT — ENCOUNTER SYMPTOMS
PSYCHIATRIC NEGATIVE: 0
NEUROLOGICAL NEGATIVE: 0
CONSTITUTIONAL NEGATIVE: 0
MUSCULOSKELETAL NEGATIVE: 0
RESPIRATORY NEGATIVE: 0
GASTROINTESTINAL NEGATIVE: 0
ENDOCRINE NEGATIVE: 0
HEMATOLOGIC/LYMPHATIC NEGATIVE: 0
CARDIOVASCULAR NEGATIVE: 0
EYES NEGATIVE: 0
ALLERGIC/IMMUNOLOGIC NEGATIVE: 0

## 2024-02-01 ASSESSMENT — CONF VISUAL FIELD
OD_INFERIOR_TEMPORAL_RESTRICTION: 0
OD_INFERIOR_NASAL_RESTRICTION: 0
OD_SUPERIOR_NASAL_RESTRICTION: 0
OS_SUPERIOR_NASAL_RESTRICTION: 0
OS_SUPERIOR_TEMPORAL_RESTRICTION: 0
OS_INFERIOR_NASAL_RESTRICTION: 0
OD_SUPERIOR_TEMPORAL_RESTRICTION: 0
OS_INFERIOR_TEMPORAL_RESTRICTION: 0
OD_NORMAL: 1
OS_NORMAL: 1

## 2024-02-01 ASSESSMENT — TONOMETRY
IOP_METHOD: GOLDMANN APPLANATION
OS_IOP_MMHG: 12
OD_IOP_MMHG: 12

## 2024-02-01 ASSESSMENT — EXTERNAL EXAM - RIGHT EYE: OD_EXAM: NORMAL

## 2024-02-01 ASSESSMENT — CUP TO DISC RATIO
OS_RATIO: .4
OD_RATIO: .4

## 2024-02-01 ASSESSMENT — EXTERNAL EXAM - LEFT EYE: OS_EXAM: NORMAL

## 2024-02-01 ASSESSMENT — SLIT LAMP EXAM - LIDS
COMMENTS: GOOD POSITION
COMMENTS: GOOD POSITION

## 2024-02-01 ASSESSMENT — VISUAL ACUITY
OD_SC+: -3
OD_SC: 20/25
METHOD: SNELLEN - LINEAR

## 2024-02-01 NOTE — PROGRESS NOTES
POw1 s/p CE+IOL OD    Doing well, vision great   taper pred stop moxi QID  discontinue Shield/activity precautions   Rtc 3-4 weeks mrx and IOP CHECK

## 2024-03-07 ENCOUNTER — APPOINTMENT (OUTPATIENT)
Dept: OPHTHALMOLOGY | Facility: CLINIC | Age: 69
End: 2024-03-07
Payer: COMMERCIAL

## 2024-03-14 ENCOUNTER — OFFICE VISIT (OUTPATIENT)
Dept: OPHTHALMOLOGY | Facility: CLINIC | Age: 69
End: 2024-03-14
Payer: COMMERCIAL

## 2024-03-14 DIAGNOSIS — H35.342 MACULAR HOLE OF LEFT EYE: ICD-10-CM

## 2024-03-14 DIAGNOSIS — Z96.1 PSEUDOPHAKIA: Primary | ICD-10-CM

## 2024-03-14 PROCEDURE — 99024 POSTOP FOLLOW-UP VISIT: CPT | Performed by: OPHTHALMOLOGY

## 2024-03-14 ASSESSMENT — ENCOUNTER SYMPTOMS
GASTROINTESTINAL NEGATIVE: 0
HEMATOLOGIC/LYMPHATIC NEGATIVE: 0
PSYCHIATRIC NEGATIVE: 0
NEUROLOGICAL NEGATIVE: 0
CONSTITUTIONAL NEGATIVE: 0
ALLERGIC/IMMUNOLOGIC NEGATIVE: 0
MUSCULOSKELETAL NEGATIVE: 0
EYES NEGATIVE: 0
ENDOCRINE NEGATIVE: 0
RESPIRATORY NEGATIVE: 0
CARDIOVASCULAR NEGATIVE: 0

## 2024-03-14 ASSESSMENT — TONOMETRY
OD_IOP_MMHG: 8
OS_IOP_MMHG: 12
IOP_METHOD: GOLDMANN APPLANATION

## 2024-03-14 ASSESSMENT — EXTERNAL EXAM - LEFT EYE: OS_EXAM: NORMAL

## 2024-03-14 ASSESSMENT — CONF VISUAL FIELD
OD_INFERIOR_TEMPORAL_RESTRICTION: 0
OS_SUPERIOR_NASAL_RESTRICTION: 0
OD_SUPERIOR_NASAL_RESTRICTION: 0
OS_INFERIOR_NASAL_RESTRICTION: 0
OS_SUPERIOR_TEMPORAL_RESTRICTION: 0
OD_SUPERIOR_TEMPORAL_RESTRICTION: 0
OD_INFERIOR_NASAL_RESTRICTION: 0
OS_INFERIOR_TEMPORAL_RESTRICTION: 0
OD_NORMAL: 1
OS_NORMAL: 1

## 2024-03-14 ASSESSMENT — REFRACTION_MANIFEST
OD_SPHERE: +0.50
OS_SPHERE: BALANCE
OD_AXIS: 040
OD_CYLINDER: +0.25
OS_ADD: +2.75
OD_ADD: +2.75

## 2024-03-14 ASSESSMENT — SLIT LAMP EXAM - LIDS
COMMENTS: GOOD POSITION
COMMENTS: GOOD POSITION

## 2024-03-14 ASSESSMENT — VISUAL ACUITY
OS_SC: CF 3'
OD_SC+: +1
METHOD: SNELLEN - LINEAR
OD_SC: 20/30

## 2024-03-14 ASSESSMENT — EXTERNAL EXAM - RIGHT EYE: OD_EXAM: NORMAL

## 2024-03-14 NOTE — PROGRESS NOTES
POm1 s/p CE+IOL OD    Mrx dispensed  Off all postop gtts  RTC sozeri 6 months for DFE/OCT MAC    #Traumatic Full Thickness Macular Hole OS  Last retina appt 4/2023, plan was to observe for spontaneous closure  Plan 9/2023 was to hold off on CEIOL given limitation of visual potential 2/2 hole  RECOMMEND RESTABLISH WITH RETINA FOR OS

## 2024-06-12 ENCOUNTER — APPOINTMENT (OUTPATIENT)
Dept: OPHTHALMOLOGY | Facility: CLINIC | Age: 69
End: 2024-06-12
Payer: COMMERCIAL

## 2024-06-12 DIAGNOSIS — H35.342 MACULAR HOLE OF LEFT EYE: Primary | ICD-10-CM

## 2024-06-12 DIAGNOSIS — H43.12 VITREOUS HEMORRHAGE OF LEFT EYE (MULTI): ICD-10-CM

## 2024-06-12 PROCEDURE — 92134 CPTRZ OPH DX IMG PST SGM RTA: CPT

## 2024-06-12 PROCEDURE — 99213 OFFICE O/P EST LOW 20 MIN: CPT

## 2024-06-12 PROCEDURE — 1160F RVW MEDS BY RX/DR IN RCRD: CPT

## 2024-06-12 PROCEDURE — 1159F MED LIST DOCD IN RCRD: CPT

## 2024-06-12 PROCEDURE — 1036F TOBACCO NON-USER: CPT

## 2024-06-12 ASSESSMENT — EXTERNAL EXAM - LEFT EYE: OS_EXAM: NORMAL

## 2024-06-12 ASSESSMENT — CONF VISUAL FIELD
OD_INFERIOR_TEMPORAL_RESTRICTION: 0
OS_INFERIOR_TEMPORAL_RESTRICTION: 2
OD_NORMAL: 1
OD_SUPERIOR_TEMPORAL_RESTRICTION: 0
OD_INFERIOR_NASAL_RESTRICTION: 0
METHOD: COUNTING FINGERS
OS_SUPERIOR_NASAL_RESTRICTION: 2
OS_INFERIOR_NASAL_RESTRICTION: 2
OD_SUPERIOR_NASAL_RESTRICTION: 0
OS_SUPERIOR_TEMPORAL_RESTRICTION: 2

## 2024-06-12 ASSESSMENT — ENCOUNTER SYMPTOMS
MUSCULOSKELETAL NEGATIVE: 0
ENDOCRINE NEGATIVE: 0
HEMATOLOGIC/LYMPHATIC NEGATIVE: 0
ALLERGIC/IMMUNOLOGIC NEGATIVE: 0
PSYCHIATRIC NEGATIVE: 0
CONSTITUTIONAL NEGATIVE: 0
NEUROLOGICAL NEGATIVE: 0
RESPIRATORY NEGATIVE: 0
GASTROINTESTINAL NEGATIVE: 0
EYES NEGATIVE: 0
CARDIOVASCULAR NEGATIVE: 0

## 2024-06-12 ASSESSMENT — SLIT LAMP EXAM - LIDS
COMMENTS: GOOD POSITION
COMMENTS: GOOD POSITION

## 2024-06-12 ASSESSMENT — VISUAL ACUITY
OS_CC: CF AT 3'
METHOD: SNELLEN - LINEAR
OD_SC: 20/30

## 2024-06-12 ASSESSMENT — TONOMETRY
OS_IOP_MMHG: 12
OD_IOP_MMHG: 10
IOP_METHOD: GOLDMANN APPLANATION

## 2024-06-12 ASSESSMENT — CUP TO DISC RATIO
OD_RATIO: .4
OS_RATIO: .4

## 2024-06-12 ASSESSMENT — EXTERNAL EXAM - RIGHT EYE: OD_EXAM: NORMAL
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
## 2024-09-19 ENCOUNTER — APPOINTMENT (OUTPATIENT)
Dept: OPHTHALMOLOGY | Facility: CLINIC | Age: 69
End: 2024-09-19
Payer: COMMERCIAL

## 2024-10-14 ENCOUNTER — HOSPITAL ENCOUNTER (EMERGENCY)
Facility: HOSPITAL | Age: 69
Discharge: HOME | End: 2024-10-14
Attending: EMERGENCY MEDICINE
Payer: COMMERCIAL

## 2024-10-14 ENCOUNTER — APPOINTMENT (OUTPATIENT)
Dept: RADIOLOGY | Facility: HOSPITAL | Age: 69
End: 2024-10-14
Payer: COMMERCIAL

## 2024-10-14 VITALS
SYSTOLIC BLOOD PRESSURE: 115 MMHG | TEMPERATURE: 98 F | DIASTOLIC BLOOD PRESSURE: 69 MMHG | HEART RATE: 56 BPM | OXYGEN SATURATION: 99 % | RESPIRATION RATE: 14 BRPM

## 2024-10-14 DIAGNOSIS — S01.81XA LACERATION OF BROW WITHOUT COMPLICATION, INITIAL ENCOUNTER: ICD-10-CM

## 2024-10-14 DIAGNOSIS — S09.90XA CLOSED HEAD INJURY, INITIAL ENCOUNTER: ICD-10-CM
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
## 2024-10-14 PROCEDURE — 73030 X-RAY EXAM OF SHOULDER: CPT | Mod: LEFT SIDE | Performed by: RADIOLOGY

## 2024-10-14 PROCEDURE — 72125 CT NECK SPINE W/O DYE: CPT

## 2024-10-14 PROCEDURE — 99285 EMERGENCY DEPT VISIT HI MDM: CPT | Mod: 25

## 2024-10-14 PROCEDURE — 2500000004 HC RX 250 GENERAL PHARMACY W/ HCPCS (ALT 636 FOR OP/ED)

## 2024-10-14 PROCEDURE — 12011 RPR F/E/E/N/L/M 2.5 CM/<: CPT

## 2024-10-14 PROCEDURE — 70450 CT HEAD/BRAIN W/O DYE: CPT | Performed by: RADIOLOGY

## 2024-10-14 PROCEDURE — 72125 CT NECK SPINE W/O DYE: CPT | Performed by: RADIOLOGY

## 2024-10-14 PROCEDURE — 2500000001 HC RX 250 WO HCPCS SELF ADMINISTERED DRUGS (ALT 637 FOR MEDICARE OP)

## 2024-10-14 PROCEDURE — 73030 X-RAY EXAM OF SHOULDER: CPT | Mod: LT

## 2024-10-14 PROCEDURE — 70450 CT HEAD/BRAIN W/O DYE: CPT

## 2024-10-14 RX ORDER — LIDOCAINE HYDROCHLORIDE 10 MG/ML
INJECTION, SOLUTION EPIDURAL; INFILTRATION; INTRACAUDAL; PERINEURAL
Status: COMPLETED
Start: 2024-10-14 | End: 2024-10-14

## 2024-10-14 RX ORDER — LIDOCAINE HYDROCHLORIDE 10 MG/ML
5 INJECTION, SOLUTION EPIDURAL; INFILTRATION; INTRACAUDAL; PERINEURAL ONCE
Status: COMPLETED | OUTPATIENT
Start: 2024-10-14 | End: 2024-10-14

## 2024-10-14 RX ORDER — ACETAMINOPHEN 325 MG/1
650 TABLET ORAL ONCE
Status: COMPLETED | OUTPATIENT
Start: 2024-10-14 | End: 2024-10-14

## 2024-10-14 ASSESSMENT — LIFESTYLE VARIABLES
EVER FELT BAD OR GUILTY ABOUT YOUR DRINKING: NO
HAVE PEOPLE ANNOYED YOU BY CRITICIZING YOUR DRINKING: NO
EVER HAD A DRINK FIRST THING IN THE MORNING TO STEADY YOUR NERVES TO GET RID OF A HANGOVER: NO
TOTAL SCORE: 0
HAVE YOU EVER FELT YOU SHOULD CUT DOWN ON YOUR DRINKING: NO

## 2024-10-14 ASSESSMENT — PAIN - FUNCTIONAL ASSESSMENT
PAIN_FUNCTIONAL_ASSESSMENT: 0-10
PAIN_FUNCTIONAL_ASSESSMENT: 0-10

## 2024-10-14 ASSESSMENT — PAIN DESCRIPTION - PAIN TYPE: TYPE: ACUTE PAIN

## 2024-10-14 ASSESSMENT — PAIN SCALES - GENERAL
PAINLEVEL_OUTOF10: 0 - NO PAIN
PAINLEVEL_OUTOF10: 7
PAINLEVEL_OUTOF10: 7

## 2024-10-14 ASSESSMENT — PAIN DESCRIPTION - LOCATION: LOCATION: HEAD

## 2024-10-14 NOTE — DISCHARGE INSTRUCTIONS
You are seen in the emergency department because you fell out of bed and had a laceration to your face.  You endorsed neck pain.  You are found to have no emergent injuries to keep you in the hospital and determined to be safe for discharge.  Please ensure staff at your facility keeps your bed railings up when you are sleeping.    We used absorbable sutures for your laceration so no need to return for suture removal.    Seek immediate medical attention if your wound develops: redness, swelling, warmth to touch, discharge or drainage, increasing pain, or any new or worsening symptoms.  Seek immediate medical attention if you develop:  fever 38 C (100.4 F), chills, nausea, vomiting, or any new or worsening symptoms.

## 2024-10-14 NOTE — ED TRIAGE NOTES
"Pt presents from [facility] following a fall from bed; pt was \"dreaming\" and immediately rolled out of bed onto floor striking head; pt's only complaint is a 2 cm laceration to the forehead causing discomfort; pt uses walker for mobility and denies chest pain, palpitations, and shortness of breath   "
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
## 2025-10-06 ENCOUNTER — APPOINTMENT (OUTPATIENT)
Dept: DERMATOLOGY | Facility: CLINIC | Age: 70
End: 2025-10-06
Payer: COMMERCIAL

## (undated) DEVICE — SOLUTION, BSS IRRIGATING 15ML

## (undated) DEVICE — SOLUTION, BSS IRRIGATING 500ML BAG

## (undated) DEVICE — SOLUTION, OPHTHALMIC, TETRACAINE HCL 0.5%, 2 ML, VIAL

## (undated) DEVICE — SOLUTION, IRRIGATION, STERILE WATER, 1000 ML, POUR BOTTLE

## (undated) DEVICE — DUOVISC .55

## (undated) DEVICE — SOLUTION, PREP, OPTHALMIC, BETADINE 5%, 30ML, STRL

## (undated) DEVICE — Device

## (undated) DEVICE — APPLICATOR, COTTON TIP, WOODEN, 6IN, STERILE

## (undated) DEVICE — SYRINGE, INSULIN, LUER LOCK, 1ML

## (undated) DEVICE — CANNULA, CHANG HYDRO- DISSECT, 27G, FLAT TIP, DISP

## (undated) DEVICE — GLOVE, SURGICAL, PROTEXIS,  6.5, PF, LATEX

## (undated) DEVICE — PHACO PAK, CENTURION, 0.9 TIPLESS

## (undated) DEVICE — CANNULA, 27G X 22MM, ANTERIOR, CHAMBER, RYCROFT

## (undated) DEVICE — SYRINGE, 3 CC LUER LOCK, W/PRECISIONGLIDE NEEDLE, IV, REGULAR BEVEL, THIN WALL, 21 G X 1.5